# Patient Record
Sex: FEMALE | Race: WHITE | NOT HISPANIC OR LATINO | Employment: FULL TIME | URBAN - METROPOLITAN AREA
[De-identification: names, ages, dates, MRNs, and addresses within clinical notes are randomized per-mention and may not be internally consistent; named-entity substitution may affect disease eponyms.]

---

## 2023-03-08 LAB
ALANINE AMINOTRANSFERASE (SGPT) (U/L) IN SER/PLAS: 24 U/L (ref 7–45)
ALBUMIN (G/DL) IN SER/PLAS: 4.9 G/DL (ref 3.4–5)
ALBUMIN (MG/L) IN URINE: 31.2 MG/L
ALBUMIN/CREATININE (UG/MG) IN URINE: 78 UG/MG CRT (ref 0–30)
ALKALINE PHOSPHATASE (U/L) IN SER/PLAS: 82 U/L (ref 33–136)
ANION GAP IN SER/PLAS: 15 MMOL/L (ref 10–20)
APPEARANCE, URINE: CLEAR
ASPARTATE AMINOTRANSFERASE (SGOT) (U/L) IN SER/PLAS: 29 U/L (ref 9–39)
BASOPHILS (10*3/UL) IN BLOOD BY AUTOMATED COUNT: 0.06 X10E9/L (ref 0–0.1)
BASOPHILS/100 LEUKOCYTES IN BLOOD BY AUTOMATED COUNT: 1 % (ref 0–2)
BILIRUBIN TOTAL (MG/DL) IN SER/PLAS: 0.7 MG/DL (ref 0–1.2)
BILIRUBIN, URINE: NEGATIVE
BLOOD, URINE: NEGATIVE
CALCIUM (MG/DL) IN SER/PLAS: 10.7 MG/DL (ref 8.6–10.6)
CARBON DIOXIDE, TOTAL (MMOL/L) IN SER/PLAS: 31 MMOL/L (ref 21–32)
CHLORIDE (MMOL/L) IN SER/PLAS: 103 MMOL/L (ref 98–107)
CHOLESTEROL (MG/DL) IN SER/PLAS: 193 MG/DL (ref 0–199)
CHOLESTEROL IN HDL (MG/DL) IN SER/PLAS: 95 MG/DL
CHOLESTEROL/HDL RATIO: 2
COLOR, URINE: NORMAL
CREATININE (MG/DL) IN SER/PLAS: 0.8 MG/DL (ref 0.5–1.05)
CREATININE (MG/DL) IN URINE: 40 MG/DL (ref 20–320)
EOSINOPHILS (10*3/UL) IN BLOOD BY AUTOMATED COUNT: 0.06 X10E9/L (ref 0–0.7)
EOSINOPHILS/100 LEUKOCYTES IN BLOOD BY AUTOMATED COUNT: 1 % (ref 0–6)
ERYTHROCYTE DISTRIBUTION WIDTH (RATIO) BY AUTOMATED COUNT: 14.8 % (ref 11.5–14.5)
ERYTHROCYTE MEAN CORPUSCULAR HEMOGLOBIN CONCENTRATION (G/DL) BY AUTOMATED: 32 G/DL (ref 32–36)
ERYTHROCYTE MEAN CORPUSCULAR VOLUME (FL) BY AUTOMATED COUNT: 95 FL (ref 80–100)
ERYTHROCYTES (10*6/UL) IN BLOOD BY AUTOMATED COUNT: 4.84 X10E12/L (ref 4–5.2)
GFR FEMALE: 80 ML/MIN/1.73M2
GLUCOSE (MG/DL) IN SER/PLAS: 108 MG/DL (ref 74–99)
GLUCOSE, URINE: NEGATIVE MG/DL
HEMATOCRIT (%) IN BLOOD BY AUTOMATED COUNT: 46.2 % (ref 36–46)
HEMOGLOBIN (G/DL) IN BLOOD: 14.8 G/DL (ref 12–16)
IMMATURE GRANULOCYTES/100 LEUKOCYTES IN BLOOD BY AUTOMATED COUNT: 0.2 % (ref 0–0.9)
KETONES, URINE: NEGATIVE MG/DL
LDL: 82 MG/DL (ref 0–99)
LEUKOCYTE ESTERASE, URINE: NEGATIVE
LEUKOCYTES (10*3/UL) IN BLOOD BY AUTOMATED COUNT: 5.8 X10E9/L (ref 4.4–11.3)
LYMPHOCYTES (10*3/UL) IN BLOOD BY AUTOMATED COUNT: 1.8 X10E9/L (ref 1.2–4.8)
LYMPHOCYTES/100 LEUKOCYTES IN BLOOD BY AUTOMATED COUNT: 31.1 % (ref 13–44)
MONOCYTES (10*3/UL) IN BLOOD BY AUTOMATED COUNT: 0.47 X10E9/L (ref 0.1–1)
MONOCYTES/100 LEUKOCYTES IN BLOOD BY AUTOMATED COUNT: 8.1 % (ref 2–10)
NEUTROPHILS (10*3/UL) IN BLOOD BY AUTOMATED COUNT: 3.39 X10E9/L (ref 1.2–7.7)
NEUTROPHILS/100 LEUKOCYTES IN BLOOD BY AUTOMATED COUNT: 58.6 % (ref 40–80)
NITRITE, URINE: NEGATIVE
NRBC (PER 100 WBCS) BY AUTOMATED COUNT: 0 /100 WBC (ref 0–0)
PH, URINE: 6 (ref 5–8)
PLATELETS (10*3/UL) IN BLOOD AUTOMATED COUNT: 295 X10E9/L (ref 150–450)
POTASSIUM (MMOL/L) IN SER/PLAS: 5.5 MMOL/L (ref 3.5–5.3)
PROTEIN TOTAL: 7.4 G/DL (ref 6.4–8.2)
PROTEIN, URINE: NEGATIVE MG/DL
SODIUM (MMOL/L) IN SER/PLAS: 143 MMOL/L (ref 136–145)
SPECIFIC GRAVITY, URINE: 1.01 (ref 1–1.03)
THYROTROPIN (MIU/L) IN SER/PLAS BY DETECTION LIMIT <= 0.05 MIU/L: 2.45 MIU/L (ref 0.44–3.98)
TRIGLYCERIDE (MG/DL) IN SER/PLAS: 82 MG/DL (ref 0–149)
UREA NITROGEN (MG/DL) IN SER/PLAS: 18 MG/DL (ref 6–23)
UROBILINOGEN, URINE: <2 MG/DL (ref 0–1.9)
VLDL: 16 MG/DL (ref 0–40)

## 2023-09-13 LAB
ALANINE AMINOTRANSFERASE (SGPT) (U/L) IN SER/PLAS: 24 U/L (ref 7–45)
ALBUMIN (G/DL) IN SER/PLAS: 4.8 G/DL (ref 3.4–5)
ALKALINE PHOSPHATASE (U/L) IN SER/PLAS: 83 U/L (ref 33–136)
ANION GAP IN SER/PLAS: 14 MMOL/L (ref 10–20)
ASPARTATE AMINOTRANSFERASE (SGOT) (U/L) IN SER/PLAS: 30 U/L (ref 9–39)
BILIRUBIN TOTAL (MG/DL) IN SER/PLAS: 0.6 MG/DL (ref 0–1.2)
CALCIUM (MG/DL) IN SER/PLAS: 9.9 MG/DL (ref 8.6–10.6)
CARBON DIOXIDE, TOTAL (MMOL/L) IN SER/PLAS: 29 MMOL/L (ref 21–32)
CHLORIDE (MMOL/L) IN SER/PLAS: 103 MMOL/L (ref 98–107)
CHOLESTEROL (MG/DL) IN SER/PLAS: 185 MG/DL (ref 0–199)
CHOLESTEROL IN HDL (MG/DL) IN SER/PLAS: 98.2 MG/DL
CHOLESTEROL/HDL RATIO: 1.9
CREATININE (MG/DL) IN SER/PLAS: 0.89 MG/DL (ref 0.5–1.05)
GFR FEMALE: 70 ML/MIN/1.73M2
GLUCOSE (MG/DL) IN SER/PLAS: 97 MG/DL (ref 74–99)
LDL: 69 MG/DL (ref 0–99)
POTASSIUM (MMOL/L) IN SER/PLAS: 4.3 MMOL/L (ref 3.5–5.3)
PROTEIN TOTAL: 7.2 G/DL (ref 6.4–8.2)
SODIUM (MMOL/L) IN SER/PLAS: 142 MMOL/L (ref 136–145)
THYROTROPIN (MIU/L) IN SER/PLAS BY DETECTION LIMIT <= 0.05 MIU/L: 14.36 MIU/L (ref 0.44–3.98)
THYROXINE (T4) FREE (NG/DL) IN SER/PLAS: 0.9 NG/DL (ref 0.78–1.48)
TRIGLYCERIDE (MG/DL) IN SER/PLAS: 90 MG/DL (ref 0–149)
UREA NITROGEN (MG/DL) IN SER/PLAS: 14 MG/DL (ref 6–23)
VLDL: 18 MG/DL (ref 0–40)

## 2023-09-18 PROBLEM — N95.1 POSTMENOPAUSAL DISORDER: Status: ACTIVE | Noted: 2023-09-18

## 2023-09-18 PROBLEM — M25.651 DECREASED RANGE OF RIGHT HIP MOVEMENT: Status: ACTIVE | Noted: 2023-09-18

## 2023-09-18 PROBLEM — R35.0 URINARY FREQUENCY: Status: ACTIVE | Noted: 2023-09-18

## 2023-09-18 PROBLEM — H10.10 ALLERGIC CONJUNCTIVITIS: Status: ACTIVE | Noted: 2023-09-18

## 2023-09-18 PROBLEM — N95.2 ATROPHY OF VAGINA: Status: ACTIVE | Noted: 2023-09-18

## 2023-09-18 PROBLEM — J30.9 ALLERGIC RHINITIS: Status: ACTIVE | Noted: 2023-09-18

## 2023-09-18 PROBLEM — R39.9 ABNORMAL RENAL FINDING: Status: ACTIVE | Noted: 2023-09-18

## 2023-09-18 PROBLEM — R94.39 ABNORMAL STRESS TEST: Status: ACTIVE | Noted: 2023-09-18

## 2023-09-18 PROBLEM — M41.80 DEXTROSCOLIOSIS: Status: ACTIVE | Noted: 2023-09-18

## 2023-09-18 PROBLEM — R23.9 RECENT SKIN CHANGES: Status: ACTIVE | Noted: 2023-09-18

## 2023-09-18 PROBLEM — R79.89 ABNORMAL LIVER FUNCTION TEST: Status: ACTIVE | Noted: 2023-09-18

## 2023-09-18 PROBLEM — R32 URINARY INCONTINENCE: Status: ACTIVE | Noted: 2023-09-18

## 2023-09-18 PROBLEM — T50.Z95A VACCINE REACTION: Status: ACTIVE | Noted: 2023-09-18

## 2023-09-18 PROBLEM — R09.89 CAROTID BRUIT: Status: ACTIVE | Noted: 2023-09-18

## 2023-09-18 PROBLEM — K30 ACID INDIGESTION: Status: ACTIVE | Noted: 2023-09-18

## 2023-09-18 PROBLEM — H90.3 SENSORINEURAL HEARING LOSS (SNHL) OF BOTH EARS: Status: ACTIVE | Noted: 2023-09-18

## 2023-09-18 PROBLEM — E78.5 HYPERLIPIDEMIA: Status: ACTIVE | Noted: 2023-09-18

## 2023-09-18 PROBLEM — E03.9 HYPOTHYROIDISM: Status: ACTIVE | Noted: 2023-09-18

## 2023-09-18 PROBLEM — R71.8 ELEVATED MCV: Status: ACTIVE | Noted: 2023-09-18

## 2023-09-18 PROBLEM — R74.8 ELEVATED LIVER ENZYMES: Status: ACTIVE | Noted: 2023-09-18

## 2023-09-18 PROBLEM — M94.0 COSTOCHONDRITIS: Status: ACTIVE | Noted: 2023-09-18

## 2023-09-18 PROBLEM — R39.9 SYMPTOMS INVOLVING URINARY SYSTEM: Status: ACTIVE | Noted: 2023-09-18

## 2023-09-18 PROBLEM — I25.10 NONOBSTRUCTIVE ATHEROSCLEROSIS OF CORONARY ARTERY: Status: ACTIVE | Noted: 2023-09-18

## 2023-09-18 PROBLEM — E55.9 VITAMIN D DEFICIENCY: Status: ACTIVE | Noted: 2023-09-18

## 2023-09-18 PROBLEM — H91.90 DECREASED HEARING: Status: ACTIVE | Noted: 2023-09-18

## 2023-09-18 PROBLEM — R00.1 BRADYCARDIA: Status: ACTIVE | Noted: 2023-09-18

## 2023-09-18 PROBLEM — R14.2 BURPING: Status: ACTIVE | Noted: 2023-09-18

## 2023-09-18 PROBLEM — Z96.641 STATUS POST RIGHT HIP REPLACEMENT: Status: ACTIVE | Noted: 2023-09-18

## 2023-09-18 PROBLEM — R20.2 TINGLING IN EXTREMITIES: Status: ACTIVE | Noted: 2023-09-18

## 2023-09-18 PROBLEM — I10 HYPERTENSION: Status: ACTIVE | Noted: 2023-09-18

## 2023-09-18 PROBLEM — R93.1 ELEVATED CORONARY ARTERY CALCIUM SCORE: Status: ACTIVE | Noted: 2023-09-18

## 2023-09-18 PROBLEM — M95.4: Status: ACTIVE | Noted: 2023-09-18

## 2023-09-18 PROBLEM — H57.89 EYE IRRITATION: Status: ACTIVE | Noted: 2023-09-18

## 2023-09-18 PROBLEM — R92.30 DENSE BREAST TISSUE: Status: ACTIVE | Noted: 2023-09-18

## 2023-09-18 PROBLEM — R00.2 HEART PALPITATIONS: Status: ACTIVE | Noted: 2023-09-18

## 2023-09-18 PROBLEM — M70.41 PREPATELLAR BURSITIS OF RIGHT KNEE: Status: ACTIVE | Noted: 2023-09-18

## 2023-09-18 RX ORDER — LIDOCAINE 50 MG/G
1 PATCH TOPICAL DAILY
COMMUNITY
Start: 2022-05-26 | End: 2023-10-18 | Stop reason: ALTCHOICE

## 2023-09-18 RX ORDER — FLUTICASONE PROPIONATE 50 MCG
2 SPRAY, SUSPENSION (ML) NASAL DAILY
COMMUNITY
Start: 2017-10-23

## 2023-09-18 RX ORDER — LISINOPRIL 10 MG/1
1 TABLET ORAL DAILY
COMMUNITY
Start: 2016-03-22 | End: 2024-03-18 | Stop reason: SDUPTHER

## 2023-09-18 RX ORDER — ETODOLAC 400 MG/1
400 TABLET, FILM COATED ORAL DAILY
COMMUNITY
Start: 2022-05-26 | End: 2023-10-18 | Stop reason: ALTCHOICE

## 2023-09-18 RX ORDER — AMOXICILLIN 500 MG/1
4 CAPSULE ORAL
COMMUNITY
Start: 2022-11-16 | End: 2023-10-18 | Stop reason: ALTCHOICE

## 2023-09-18 RX ORDER — CETIRIZINE HYDROCHLORIDE 10 MG/1
1 TABLET ORAL DAILY
COMMUNITY

## 2023-09-18 RX ORDER — ONDANSETRON 4 MG/1
4 TABLET, FILM COATED ORAL EVERY 6 HOURS PRN
COMMUNITY
Start: 2022-08-22 | End: 2023-10-18 | Stop reason: ALTCHOICE

## 2023-09-18 RX ORDER — CHLORHEXIDINE GLUCONATE ORAL RINSE 1.2 MG/ML
15 SOLUTION DENTAL
COMMUNITY
Start: 2022-08-05 | End: 2023-10-18 | Stop reason: ALTCHOICE

## 2023-09-18 RX ORDER — OLOPATADINE HYDROCHLORIDE 2 MG/ML
SOLUTION/ DROPS OPHTHALMIC
COMMUNITY
Start: 2020-10-07

## 2023-09-18 RX ORDER — LEVOTHYROXINE SODIUM 50 UG/1
1 TABLET ORAL DAILY
COMMUNITY
Start: 2021-05-12 | End: 2024-03-18 | Stop reason: SDUPTHER

## 2023-09-18 RX ORDER — CYCLOBENZAPRINE HCL 10 MG
1 TABLET ORAL NIGHTLY PRN
COMMUNITY
Start: 2015-10-07 | End: 2023-12-26

## 2023-09-18 RX ORDER — MUPIROCIN 20 MG/G
OINTMENT TOPICAL
COMMUNITY
End: 2024-03-18 | Stop reason: SDUPTHER

## 2023-09-18 RX ORDER — ROSUVASTATIN CALCIUM 40 MG/1
1 TABLET, COATED ORAL NIGHTLY
COMMUNITY
Start: 2018-11-12 | End: 2024-02-23 | Stop reason: SDUPTHER

## 2023-10-18 ENCOUNTER — OFFICE VISIT (OUTPATIENT)
Dept: CARDIOLOGY | Facility: HOSPITAL | Age: 68
End: 2023-10-18
Payer: MEDICARE

## 2023-10-18 VITALS
HEIGHT: 62 IN | WEIGHT: 114 LBS | DIASTOLIC BLOOD PRESSURE: 60 MMHG | HEART RATE: 83 BPM | BODY MASS INDEX: 20.98 KG/M2 | SYSTOLIC BLOOD PRESSURE: 148 MMHG

## 2023-10-18 DIAGNOSIS — I10 HYPERTENSION, UNSPECIFIED TYPE: ICD-10-CM

## 2023-10-18 DIAGNOSIS — I25.10 NONOBSTRUCTIVE ATHEROSCLEROSIS OF CORONARY ARTERY: Primary | ICD-10-CM

## 2023-10-18 DIAGNOSIS — E78.00 PURE HYPERCHOLESTEROLEMIA: ICD-10-CM

## 2023-10-18 PROCEDURE — 3078F DIAST BP <80 MM HG: CPT | Performed by: INTERNAL MEDICINE

## 2023-10-18 PROCEDURE — 93010 ELECTROCARDIOGRAM REPORT: CPT | Performed by: INTERNAL MEDICINE

## 2023-10-18 PROCEDURE — 3077F SYST BP >= 140 MM HG: CPT | Performed by: INTERNAL MEDICINE

## 2023-10-18 PROCEDURE — 1036F TOBACCO NON-USER: CPT | Performed by: INTERNAL MEDICINE

## 2023-10-18 PROCEDURE — 93005 ELECTROCARDIOGRAM TRACING: CPT | Performed by: INTERNAL MEDICINE

## 2023-10-18 PROCEDURE — 99214 OFFICE O/P EST MOD 30 MIN: CPT | Performed by: INTERNAL MEDICINE

## 2023-10-18 PROCEDURE — 1159F MED LIST DOCD IN RCRD: CPT | Performed by: INTERNAL MEDICINE

## 2023-10-18 PROCEDURE — 1160F RVW MEDS BY RX/DR IN RCRD: CPT | Performed by: INTERNAL MEDICINE

## 2023-10-18 RX ORDER — ASPIRIN 81 MG/1
81 TABLET ORAL DAILY
COMMUNITY
End: 2023-10-18 | Stop reason: ALTCHOICE

## 2023-10-18 ASSESSMENT — PATIENT HEALTH QUESTIONNAIRE - PHQ9
2. FEELING DOWN, DEPRESSED OR HOPELESS: NOT AT ALL
SUM OF ALL RESPONSES TO PHQ9 QUESTIONS 1 AND 2: 0
1. LITTLE INTEREST OR PLEASURE IN DOING THINGS: NOT AT ALL

## 2023-10-18 ASSESSMENT — ENCOUNTER SYMPTOMS
DEPRESSION: 0
OCCASIONAL FEELINGS OF UNSTEADINESS: 0
LOSS OF SENSATION IN FEET: 0

## 2023-10-18 NOTE — PROGRESS NOTES
Primary Care Physician: Milo Joseph MD  Date of Visit: 10/18/2023  9:00 AM EDT  Location of visit: [facility]     Chief Complaint:   Chief Complaint   Patient presents with    Coronary Artery Disease    Hypertension        HPI / Summary:   Lashae Wolf is a 68 y.o. female presents for followup.  She has no complaints.  She is physically active and walks up to 2-1/2 miles daily without chest pain or shortness of breath.  She monitors her blood pressure at home weekly and her blood pressure is usually in the 120s over 60s.  The patient denies chest pain, shortness of breath, palpitations, lightheadedness, syncope, orthopnea, paroxysmal nocturnal dyspnea, lower extremity edema, or bleeding problems.          Past Medical History:  Past Medical History:   Diagnosis Date    Abnormal levels of other serum enzymes 03/15/2019    Elevated liver enzymes    Acquired deformity of chest and rib 07/29/2019    Acquired deformity of rib of left side    Acute atopic conjunctivitis, unspecified eye 05/22/2020    Allergic conjunctivitis    Adverse effect of other vaccines and biological substances, initial encounter 09/22/2022    Vaccine reaction    Asymptomatic menopausal state 05/26/2022    Asymptomatic menopausal state    Bradycardia, unspecified 08/25/2016    Bradycardia    Chondrocostal junction syndrome (tietze) 06/07/2017    Costochondritis    Dorsalgia, unspecified 05/26/2022    Back ache    Encounter for general adult medical examination without abnormal findings 05/26/2022    Encounter for Medicare annual wellness exam    Encounter for immunization 01/06/2015    Flu vaccine need    Encounter for immunization 05/22/2020    Need for pneumococcal vaccination    Encounter for immunization 07/16/2018    Need for shingles vaccine    Encounter for immunization 10/07/2015    Need for vaccination    Eructation 06/07/2017    Burping    Essential (primary) hypertension 10/31/2022    Hypertension     Functional dyspepsia 09/22/2022    Acid indigestion    Hypothyroidism, unspecified 10/18/2022    Hypothyroidism    Menopausal and female climacteric states 07/19/2016    Postmenopausal disorder    Other abnormality of red blood cells 09/09/2019    Elevated MCV    Other forms of scoliosis, site unspecified 02/28/2018    Dextroscoliosis    Other specified abnormal findings of blood chemistry 10/31/2022    Abnormal liver function test    Prepatellar bursitis, right knee 05/09/2022    Prepatellar bursitis of right knee    Unspecified conjunctivitis 05/06/2020    Conjunctivitis    Unspecified symptoms and signs involving the genitourinary system 10/31/2022    Abnormal renal finding    Urge incontinence 02/10/2015    Urge incontinence of urine    Urinary tract infection, site not specified 11/25/2014    Bacterial UTI    Vitamin D deficiency, unspecified 10/31/2022    Vitamin D deficiency        Past Surgical History:  History reviewed. No pertinent surgical history.       Social History:   reports that she has never smoked. She has never used smokeless tobacco. She reports current alcohol use of about 7.0 standard drinks of alcohol per week. She reports that she does not use drugs.     Family History:  family history includes Esophageal cancer in her father; Hypertension in her mother and sister; Parkinsonism in her sister; Stroke in her mother; Throat cancer in her father.      Allergies:  Allergies   Allergen Reactions    Sulfa (Sulfonamide Antibiotics) Other     Respiratory distress          Outpatient Medications:  Current Outpatient Medications   Medication Instructions    aspirin 81 mg, oral, Daily    cetirizine (ZyrTEC) 10 mg tablet 1 tablet, oral, Daily    cyclobenzaprine (Flexeril) 10 mg tablet 1 tablet, oral, Nightly PRN    fluticasone (Flonase) 50 mcg/actuation nasal spray 2 sprays, nasal, Daily    levothyroxine (Synthroid, Levoxyl) 50 mcg tablet 1 tablet, oral, Daily    lisinopril 10 mg tablet 1 tablet, oral,  "Daily    multivitamin/iron/folic acid (CENTRUM WOMEN ORAL) 1 tablet, oral, Daily    mupirocin (Bactroban) 2 % ointment Topical, 3 times daily RT    olopatadine (Pataday) 0.2 % ophthalmic solution ophthalmic (eye)    rosuvastatin (Crestor) 40 mg tablet 1 tablet, oral, Nightly       Physical Exam:  Vitals:    10/18/23 0849   BP: 148/60   BP Location: Left arm   Patient Position: Sitting   BP Cuff Size: Adult   Pulse: 83   Weight: 51.7 kg (114 lb)   Height: 1.575 m (5' 2\")     Wt Readings from Last 5 Encounters:   10/18/23 51.7 kg (114 lb)   03/13/23 49.4 kg (109 lb)   10/19/22 49.9 kg (110 lb 0.5 oz)   07/25/22 52.6 kg (116 lb)   05/26/22 49.9 kg (110 lb)     Body mass index is 20.85 kg/m².   General: Well-developed well-nourished in no acute distress  HEENT: Normocephalic atraumatic  Neck: Supple, JVP is normal negative hepatojugular reflux 2+ carotid pulses right carotid bruit  Pulmonary: Normal respiratory effort, clear to auscultation  Cardiovascular: No right ventricular heave, normal S1 and S2, no murmurs rubs or gallops  Abdomen: Soft nontender nondistended  Extremities: Warm without edema 2+ radial pulses bilaterally   Neurologic: Alert and oriented x3  Psychiatric: Normal mood and affect     Last Labs:  CMP:  Recent Labs     08/10/22  0944 10/26/22  0840 11/16/22  0839 03/08/23  0845 09/13/23  0844    143 141 143 142   K 4.3 4.6 3.5 5.5* 4.3    105 103 103 103   CO2 28 28 28 31 29   ANIONGAP 13 15 14 15 14   BUN 17 18 14 18 14   CREATININE 0.86 0.69 0.74 0.80 0.89   GLUCOSE 126* 103* 113* 108* 97     Recent Labs     05/16/22  0833 08/10/22  0944 10/26/22  0840 03/08/23  0845 09/13/23  0844   ALBUMIN 4.8 4.9 4.5 4.9 4.8   ALKPHOS 67 68 83 82 83   ALT 22 22 18 24 24   AST 23 26 26 29 30   BILITOT 0.7 0.6 0.5 0.7 0.6     CBC:  Recent Labs     05/14/20  0845 05/12/21  0840 05/16/22  0833 08/10/22  0944 03/08/23  0845   WBC 6.2 5.7 5.3 7.6 5.8   HGB 14.7 14.8 14.3 14.6 14.8   HCT 45.2 45.9 44.2 44.3 " "46.2*    324 285 309 295   MCV 98 98 99 99 95     COAG:   Recent Labs     08/28/18  1116   INR 1.0     HEME/ENDO:  Recent Labs     05/16/22  0833 10/26/22  0840 03/08/23  0845 09/13/23  0844   TSH 2.64 2.32 2.45 14.36*      CARDIAC: No results for input(s): \"LDH\", \"CKMB\", \"TROPHS\", \"BNP\" in the last 83567 hours.    No lab exists for component: \"CK\", \"CKMBP\"  Recent Labs     10/26/22  0840 03/08/23  0845 09/13/23  0844   CHOL 192 193 185   LDLF 85 82 69   HDL 86.4 95.0 98.2   TRIG 104 82 90       Last Cardiology Tests:  ECG:  Electrocardiogram performed today that I reviewed shows normal sinus rhythm with sinus arrhythmia nonspecific ST abnormality.         Cath:  Cardiac catheterization August 31, 2018  Coronary Lesion Summary:  Vessel   Stenosis   Vessel Segment  LAD    30% stenosis proximal to mid  RCA    30% stenosis    proximal           Cardiac Imaging:  CT cardiac scoring wo IV contrast  Narrative: Interpreted By:  SHAI GONZALEZ MD  MRN: [id number]  Patient Name: NII VILLA     STUDY:  CT CARDIAC SCORING;  8/30/2023 8:14 am     INDICATION:  high cholesterol  E78.5: Hyperlipidemia.     COMPARISON:  None.     ACCESSION NUMBER(S):  [id number]     ORDERING CLINICIAN:  CLIVE FITZGERALD     TECHNIQUE:  Using prospective ECG gating, CT scan of the coronary arteries was  performed without intravenous contrast. Coronary calcium scoring  was  performed according to the method of Agatston.     FINDINGS:  The score and distribution of calcium in the coronary arteries is as  follows:     LM 0,  .91,  LCx 0.25,  RCA 0,     Total 275     The visualized mid/lower ascending thoracic aorta measures 2.9 cm in  diameter. Moderate-severe calcified atherosclerosis of the descending  thoracic aorta. The heart is normal in size. No pericardial effusion  is present.     No gross evidence of mediastinal or hilar lymphadenopathy or masses  is identified. The visualized segments of the lungs are " "normally  expanded. Bilateral mucous plugging noted.     The visualized subdiaphragmatic structures appear intact.Hepatomegaly.     Impression: 1. Coronary artery calcium score of 275*.  2. Moderate-severe calcified aortic atherosclerosis.     *Coronary artery calcium scoring may be helpful in predicting the  risk for future coronary heart disease events.  According to the  American College of Cardiology Foundation Clinical Expert Consensus  Task Force, such testing provides important prognostic information in  patients with more than one coronary heart disease risk factor. The  coronary artery calcium score correlates with the annual risk of a  non-fatal myocardial infarction or coronary heart disease death.     Coronary artery score            Annual Risk     0-99                             0.4%  100-399                        1.3%  >400                            2.4%     These three \"breakpoints\" correspond to lower, intermediate and high  risk states for future coronary events.  Such information should be  used, along with appropriate clinical judgment, to make decisions  regarding the intensity of risk factor management strategies to treat  blood lipids and to modify other non-lipid coronary risk factors.     Reference: Galeton P et al. Circulation.  2007; 115:402-426     MACRO:  None      Carotid ultrasound October 27, 2022  CONCLUSIONS:  Right Carotid: Findings are consistent with less than 50% stenosis of the right proximal ICA. Right external carotid artery appears patent with no evidence of stenosis. No evidence of hemodynamically significant stenosis of the right common carotid artery. The right vertebral artery is patent with antegrade flow. There are elevated velocities in the right subclavian that are suggestive of disease.  Left Carotid: Findings are consistent with less than 50% stenosis of the left proximal ICA. Left external carotid artery appears patent with no evidence of stenosis. No " evidence of hemodynamically significant stenosis of the left common carotid artery. The left vertebral artery is patent with antegrade flow. No evidence of hemodynamically significant stenosis in the left subclavian.    Assessment/Plan   Diagnoses and all orders for this visit:  Nonobstructive atherosclerosis of coronary artery  Hypertension, unspecified type  Pure hypercholesterolemia    In summary Ms. Wolf is a pleasant 68 year-old white female with a past medical history significant for nonobstructive coronary artery disease on angiography with a CT calcium score of 275 in 2023 with preserved LV function, hyperlipidemia with previous intolerance to ezetimibe, and hypertension.  She is asymptomatic from a cardiac perspective.  Her blood pressure is mildly elevated today but her home readings are better.  She is going to continue to monitor her blood pressure at home.  Her most recent lipid profile is now goal.  I discussed the risks and benefits of aspirin in the setting of primary invention and we decided to discontinue it.  She should continue her other cardiovascular medications.  We will see her back in follow-up in 1 year.    Orders:  No orders of the defined types were placed in this encounter.     Followup Appts:  Future Appointments   Date Time Provider Department Center   1/24/2024  9:00 AM Vicki Boogie MD [id number] [facility]   1/29/2024  9:00 AM [facility] [id number] DEXA [id number] [facility]   2/26/2024  8:30 AM [facility] AHU EDNA 5 [id number] AHU Rad   3/18/2024  8:30 AM Milo Joseph MD [id number] [facility]   8/5/2024 10:15 AM Sharri Garcia MD [id number] [facility]           ____________________________________________________________  Shemar De La Vega MD  [facility]  [facility]

## 2023-12-03 LAB
ATRIAL RATE: 83 BPM
P AXIS: 87 DEGREES
P OFFSET: 196 MS
P ONSET: 146 MS
PR INTERVAL: 140 MS
Q ONSET: 216 MS
QRS COUNT: 14 BEATS
QRS DURATION: 90 MS
QT INTERVAL: 382 MS
QTC CALCULATION(BAZETT): 448 MS
QTC FREDERICIA: 425 MS
R AXIS: 79 DEGREES
T AXIS: 72 DEGREES
T OFFSET: 407 MS
VENTRICULAR RATE: 83 BPM

## 2023-12-04 ENCOUNTER — LAB (OUTPATIENT)
Dept: LAB | Facility: LAB | Age: 68
End: 2023-12-04
Payer: MEDICARE

## 2023-12-04 DIAGNOSIS — E03.9 HYPOTHYROIDISM, UNSPECIFIED: Primary | ICD-10-CM

## 2023-12-04 LAB — TSH SERPL-ACNC: 1.66 MIU/L (ref 0.44–3.98)

## 2023-12-04 PROCEDURE — 36415 COLL VENOUS BLD VENIPUNCTURE: CPT

## 2023-12-04 PROCEDURE — 84443 ASSAY THYROID STIM HORMONE: CPT

## 2023-12-21 DIAGNOSIS — M54.9 BACK PAIN, UNSPECIFIED BACK LOCATION, UNSPECIFIED BACK PAIN LATERALITY, UNSPECIFIED CHRONICITY: Primary | ICD-10-CM

## 2023-12-26 RX ORDER — CYCLOBENZAPRINE HCL 10 MG
10 TABLET ORAL NIGHTLY PRN
Qty: 90 TABLET | Refills: 3 | Status: SHIPPED | OUTPATIENT
Start: 2023-12-26

## 2024-01-04 ENCOUNTER — APPOINTMENT (OUTPATIENT)
Dept: DERMATOLOGY | Facility: CLINIC | Age: 69
End: 2024-01-04
Payer: MEDICARE

## 2024-01-10 ENCOUNTER — TELEPHONE (OUTPATIENT)
Dept: ORTHOPEDIC SURGERY | Facility: CLINIC | Age: 69
End: 2024-01-10
Payer: MEDICARE

## 2024-01-10 DIAGNOSIS — Z01.818 PRE-OP EXAM: Primary | ICD-10-CM

## 2024-01-10 RX ORDER — AMOXICILLIN 500 MG/1
CAPSULE ORAL
Qty: 4 CAPSULE | Refills: 6 | Status: SHIPPED | OUTPATIENT
Start: 2024-01-10 | End: 2024-03-18 | Stop reason: ALTCHOICE

## 2024-01-10 NOTE — TELEPHONE ENCOUNTER
8/22/22 RT ZACK  Patient needs refill on Amoxicillin 500mg oral tab. Upcoming dental appointment.  [facility]. Gregory Mireles. [place], [place]

## 2024-01-22 ENCOUNTER — TELEPHONE (OUTPATIENT)
Dept: PRIMARY CARE | Facility: CLINIC | Age: 69
End: 2024-01-22
Payer: MEDICARE

## 2024-01-22 DIAGNOSIS — Z78.0 ASYMPTOMATIC MENOPAUSAL STATE: Primary | ICD-10-CM

## 2024-01-29 ENCOUNTER — ANCILLARY PROCEDURE (OUTPATIENT)
Dept: RADIOLOGY | Facility: CLINIC | Age: 69
End: 2024-01-29
Payer: MEDICARE

## 2024-01-29 DIAGNOSIS — Z78.0 ASYMPTOMATIC MENOPAUSAL STATE: ICD-10-CM

## 2024-01-29 PROCEDURE — 77080 DXA BONE DENSITY AXIAL: CPT | Performed by: RADIOLOGY

## 2024-01-29 PROCEDURE — 77080 DXA BONE DENSITY AXIAL: CPT

## 2024-02-01 ASSESSMENT — ENCOUNTER SYMPTOMS
HOARSE VOICE: 0
HEADACHES: 0
SWOLLEN GLANDS: 0
COUGH: 0
SHORTNESS OF BREATH: 0
DIARRHEA: 0
TROUBLE SWALLOWING: 0
SORE THROAT: 1
ABDOMINAL PAIN: 0
VOMITING: 0
NECK PAIN: 1
STRIDOR: 0

## 2024-02-02 ENCOUNTER — TELEMEDICINE (OUTPATIENT)
Dept: PRIMARY CARE | Facility: CLINIC | Age: 69
End: 2024-02-02
Payer: MEDICARE

## 2024-02-02 DIAGNOSIS — J02.9 SORE THROAT: Primary | ICD-10-CM

## 2024-02-02 PROCEDURE — 99213 OFFICE O/P EST LOW 20 MIN: CPT | Performed by: INTERNAL MEDICINE

## 2024-02-02 RX ORDER — DOXYCYCLINE 100 MG/1
100 CAPSULE ORAL 2 TIMES DAILY
Qty: 14 CAPSULE | Refills: 0 | Status: SHIPPED | OUTPATIENT
Start: 2024-02-02 | End: 2024-02-09

## 2024-02-02 ASSESSMENT — ENCOUNTER SYMPTOMS
DIARRHEA: 0
STRIDOR: 0
SWOLLEN GLANDS: 0
ABDOMINAL PAIN: 0
SORE THROAT: 1
HEADACHES: 0
SHORTNESS OF BREATH: 0
COUGH: 0
TROUBLE SWALLOWING: 0
HOARSE VOICE: 0
NECK PAIN: 1
VOMITING: 0

## 2024-02-02 NOTE — PROGRESS NOTES
Subjective   Patient ID: Lashae Wolf is a 68 y.o. female who presents for No chief complaint on file..    Sore Throat   The current episode started in the past 7 days. The problem has been gradually improving. Neither side of throat is experiencing more pain than the other. The maximum temperature recorded prior to her arrival was 102 - 102.9 F. The fever has been present for 1 to 2 days. The pain is at a severity of 6/10. Associated symptoms include congestion, a plugged ear sensation and neck pain. Pertinent negatives include no abdominal pain, coughing, diarrhea, drooling, ear discharge, ear pain, headaches, hoarse voice, shortness of breath, stridor, swollen glands, trouble swallowing or vomiting. She has had no exposure to strep or mono.       I performed this visit using real-time telehealth tools, including an audio/video connection between Lashae Wolf and myself Dr Talavera in office [facility], [place], [place]  Patient on with me on a virtual visit, not feeling well since Monday sore throat Tuesday fever 102 ,covid negative yesterday  No ill contacts,  no travel, a lot of nasal    Review of Systems   HENT:  Positive for congestion and sore throat. Negative for drooling, ear discharge, ear pain, hoarse voice and trouble swallowing.    Respiratory:  Negative for cough, shortness of breath and stridor.    Gastrointestinal:  Negative for abdominal pain, diarrhea and vomiting.   Musculoskeletal:  Positive for neck pain.   Neurological:  Negative for headaches.     General: see hpi  Ears, Nose, Throat : see hpi  Dermatologic: Negative for new skin conditions, rash  Respiratory: see hpi  Cardiovascular: Negative for chest pain, palpitations, or leg swelling  Gastrointestinal: Negative for nausea/vomiting, abdominal pain, changes in bowel habits  Neurological: Negative for dizziness see hpi headaches        Previous history  Past Medical History:   Diagnosis Date    Abnormal levels of  other serum enzymes 03/15/2019    Elevated liver enzymes    Acquired deformity of chest and rib 07/29/2019    Acquired deformity of rib of left side    Acute atopic conjunctivitis, unspecified eye 05/22/2020    Allergic conjunctivitis    Adverse effect of other vaccines and biological substances, initial encounter 09/22/2022    Vaccine reaction    Asymptomatic menopausal state 05/26/2022    Asymptomatic menopausal state    Bradycardia, unspecified 08/25/2016    Bradycardia    Chondrocostal junction syndrome (tietze) 06/07/2017    Costochondritis    Dorsalgia, unspecified 05/26/2022    Back ache    Encounter for general adult medical examination without abnormal findings 05/26/2022    Encounter for Medicare annual wellness exam    Encounter for immunization 01/06/2015    Flu vaccine need    Encounter for immunization 05/22/2020    Need for pneumococcal vaccination    Encounter for immunization 07/16/2018    Need for shingles vaccine    Encounter for immunization 10/07/2015    Need for vaccination    Eructation 06/07/2017    Burping    Essential (primary) hypertension 10/31/2022    Hypertension    Functional dyspepsia 09/22/2022    Acid indigestion    Hypothyroidism, unspecified 10/18/2022    Hypothyroidism    Menopausal and female climacteric states 07/19/2016    Postmenopausal disorder    Other abnormality of red blood cells 09/09/2019    Elevated MCV    Other forms of scoliosis, site unspecified 02/28/2018    Dextroscoliosis    Other specified abnormal findings of blood chemistry 10/31/2022    Abnormal liver function test    Prepatellar bursitis, right knee 05/09/2022    Prepatellar bursitis of right knee    Unspecified conjunctivitis 05/06/2020    Conjunctivitis    Unspecified symptoms and signs involving the genitourinary system 10/31/2022    Abnormal renal finding    Urge incontinence 02/10/2015    Urge incontinence of urine    Urinary tract infection, site not specified 11/25/2014    Bacterial UTI    Vitamin D  deficiency, unspecified 10/31/2022    Vitamin D deficiency     No past surgical history on file.  Social History     Tobacco Use    Smoking status: Never    Smokeless tobacco: Never   Substance Use Topics    Alcohol use: Yes     Alcohol/week: 7.0 standard drinks of alcohol     Types: 7 Glasses of wine per week    Drug use: Never     Family History   Problem Relation Name Age of Onset    Hypertension Mother      Stroke Mother      Esophageal cancer Father      Throat cancer Father      Hypertension Sister      Parkinsonism Sister       Allergies   Allergen Reactions    Sulfa (Sulfonamide Antibiotics) Other     Respiratory distress        Current Outpatient Medications   Medication Instructions    amoxicillin (Amoxil) 500 mg capsule Take 4 Tablets 1 Hour Prior to Dental Procedure or Cleaning.    cetirizine (ZyrTEC) 10 mg tablet 1 tablet, oral, Daily    cyclobenzaprine (FLEXERIL) 10 mg, oral, Nightly PRN    fluticasone (Flonase) 50 mcg/actuation nasal spray 2 sprays, nasal, Daily    levothyroxine (Synthroid, Levoxyl) 50 mcg tablet 1 tablet, oral, Daily    lisinopril 10 mg tablet 1 tablet, oral, Daily    multivitamin/iron/folic acid (CENTRUM WOMEN ORAL) 1 tablet, oral, Daily    mupirocin (Bactroban) 2 % ointment Topical, 3 times daily RT    olopatadine (Pataday) 0.2 % ophthalmic solution ophthalmic (eye)    rosuvastatin (Crestor) 40 mg tablet 1 tablet, oral, Nightly       Objective       Physical Exam  via [employer]  General: Well groomed, well nourished , speaks full sentences  Alert Cooperative , no apparent distress   Skin: Good color does not appear dehydrated   Eyes: Extra ocular muscle movements intact, anicteric sclerae  Neck: Supple, with good range of motion looking behind her and moving head sideways to cough   Neurological: Alert, oriented      Assessment/Plan   Lashae Wolf is a 68 y.o. female who presents for the concerns below:    Problem List Items Addressed This Visit    None    SORE THROAT  FEVER PLAN: Supportive care, plenty of fluids, Tylenol as needed with food, OTC decongestants , SALT WATER gargles five times adayif with elevated blood pressure use prescribed steroid nasal spray, antibiotics if appropriate to treat bacterial etiology,  Doxycycline or erythromycin group), frequent handwashing, sanitize surrounding objects, change toothbrush. If any problems arise patient to call or come back in. More than 50% of office visit time spent counseling the patients, questions were answered.    Time Spent  with patient:  6  minutes of which greater than 50 percent was spent counselling and coordinating care.         Discussed with:   Return in :    Portions of this note were generated using digital voice recognition software, and may contain grammatical errors       Milo Joseph MD  02/02/24  1:04 PM

## 2024-02-08 ENCOUNTER — HOSPITAL ENCOUNTER (OUTPATIENT)
Dept: RADIOLOGY | Facility: CLINIC | Age: 69
Discharge: HOME | End: 2024-02-08
Payer: MEDICARE

## 2024-02-08 ENCOUNTER — OFFICE VISIT (OUTPATIENT)
Dept: PRIMARY CARE | Facility: CLINIC | Age: 69
End: 2024-02-08
Payer: MEDICARE

## 2024-02-08 VITALS — DIASTOLIC BLOOD PRESSURE: 60 MMHG | SYSTOLIC BLOOD PRESSURE: 145 MMHG | BODY MASS INDEX: 20.12 KG/M2 | WEIGHT: 110 LBS

## 2024-02-08 DIAGNOSIS — R05.9 COUGH, UNSPECIFIED TYPE: ICD-10-CM

## 2024-02-08 DIAGNOSIS — E46 PROTEIN-CALORIE MALNUTRITION, UNSPECIFIED SEVERITY (MULTI): Primary | ICD-10-CM

## 2024-02-08 PROCEDURE — 71101 X-RAY EXAM UNILAT RIBS/CHEST: CPT | Mod: RT

## 2024-02-08 PROCEDURE — 99214 OFFICE O/P EST MOD 30 MIN: CPT | Performed by: INTERNAL MEDICINE

## 2024-02-08 PROCEDURE — 3078F DIAST BP <80 MM HG: CPT | Performed by: INTERNAL MEDICINE

## 2024-02-08 PROCEDURE — 71101 X-RAY EXAM UNILAT RIBS/CHEST: CPT | Mod: RIGHT SIDE | Performed by: RADIOLOGY

## 2024-02-08 PROCEDURE — 3077F SYST BP >= 140 MM HG: CPT | Performed by: INTERNAL MEDICINE

## 2024-02-08 PROCEDURE — 1036F TOBACCO NON-USER: CPT | Performed by: INTERNAL MEDICINE

## 2024-02-08 PROCEDURE — 1159F MED LIST DOCD IN RCRD: CPT | Performed by: INTERNAL MEDICINE

## 2024-02-08 ASSESSMENT — PATIENT HEALTH QUESTIONNAIRE - PHQ9
1. LITTLE INTEREST OR PLEASURE IN DOING THINGS: NOT AT ALL
2. FEELING DOWN, DEPRESSED OR HOPELESS: NOT AT ALL
SUM OF ALL RESPONSES TO PHQ9 QUESTIONS 1 AND 2: 0

## 2024-02-08 ASSESSMENT — COLUMBIA-SUICIDE SEVERITY RATING SCALE - C-SSRS
2. HAVE YOU ACTUALLY HAD ANY THOUGHTS OF KILLING YOURSELF?: NO
1. IN THE PAST MONTH, HAVE YOU WISHED YOU WERE DEAD OR WISHED YOU COULD GO TO SLEEP AND NOT WAKE UP?: NO
6. HAVE YOU EVER DONE ANYTHING, STARTED TO DO ANYTHING, OR PREPARED TO DO ANYTHING TO END YOUR LIFE?: NO

## 2024-02-08 ASSESSMENT — ENCOUNTER SYMPTOMS
LOSS OF SENSATION IN FEET: 0
OCCASIONAL FEELINGS OF UNSTEADINESS: 0
DEPRESSION: 0

## 2024-02-08 NOTE — PROGRESS NOTES
Subjective   Patient ID: Lashae Wolf is a 68 y.o. female who presents for EPV and Pain (Lower thorax/upper abdomen.  Right side.  Started the past two night.).    HPI  Patient in for a visit  Stopped the tylenol no fever since Sunday, just the doxycycline   Right hip was painful but now pain is higher on the right side   Sharp pain since a bad coughing spell Monday night and then Tuesday night hurt again right side , took a cyclobenzaprine last night it helped to take down the pain from 8-9/10 to a 4-5/10 , pain with laying down and taking very deep breaths     Review of Systems  General: Denies fever, chills, night sweats,  Eyes: Negative for recent visual changes  Ears, Nose, Throat :  Negative for hearing changes, sinus discomfort  Dermatologic: Negative for new skin conditions, rash  Respiratory: Negative for wheezing, shortness of breath, cough  Cardiovascular: Negative for chest pain, palpitations, or leg swelling  Gastrointestinal: Negative for nausea/vomiting, abdominal pain, changes in bowel habits  Genitourinary NEGATIVE FOR URINARY INCONTINENCE urgency , frequency, discomfort   Musculoskeletal: see hpi  Neurological: Negative for headaches, dizziness    Previous history  Past Medical History:   Diagnosis Date    Abnormal levels of other serum enzymes 03/15/2019    Elevated liver enzymes    Acquired deformity of chest and rib 07/29/2019    Acquired deformity of rib of left side    Acute atopic conjunctivitis, unspecified eye 05/22/2020    Allergic conjunctivitis    Adverse effect of other vaccines and biological substances, initial encounter 09/22/2022    Vaccine reaction    Asymptomatic menopausal state 05/26/2022    Asymptomatic menopausal state    Bradycardia, unspecified 08/25/2016    Bradycardia    Chondrocostal junction syndrome (tietze) 06/07/2017    Costochondritis    Dorsalgia, unspecified 05/26/2022    Back ache    Encounter for general adult medical examination without abnormal findings  05/26/2022    Encounter for Medicare annual wellness exam    Encounter for immunization 01/06/2015    Flu vaccine need    Encounter for immunization 05/22/2020    Need for pneumococcal vaccination    Encounter for immunization 07/16/2018    Need for shingles vaccine    Encounter for immunization 10/07/2015    Need for vaccination    Eructation 06/07/2017    Burping    Essential (primary) hypertension 10/31/2022    Hypertension    Functional dyspepsia 09/22/2022    Acid indigestion    Hypothyroidism, unspecified 10/18/2022    Hypothyroidism    Menopausal and female climacteric states 07/19/2016    Postmenopausal disorder    Other abnormality of red blood cells 09/09/2019    Elevated MCV    Other forms of scoliosis, site unspecified 02/28/2018    Dextroscoliosis    Other specified abnormal findings of blood chemistry 10/31/2022    Abnormal liver function test    Prepatellar bursitis, right knee 05/09/2022    Prepatellar bursitis of right knee    Unspecified conjunctivitis 05/06/2020    Conjunctivitis    Unspecified symptoms and signs involving the genitourinary system 10/31/2022    Abnormal renal finding    Urge incontinence 02/10/2015    Urge incontinence of urine    Urinary tract infection, site not specified 11/25/2014    Bacterial UTI    Vitamin D deficiency, unspecified 10/31/2022    Vitamin D deficiency     No past surgical history on file.  Social History     Tobacco Use    Smoking status: Never    Smokeless tobacco: Never   Vaping Use    Vaping Use: Never used   Substance Use Topics    Alcohol use: Yes     Alcohol/week: 7.0 standard drinks of alcohol     Types: 7 Glasses of wine per week    Drug use: Never     Family History   Problem Relation Name Age of Onset    Hypertension Mother      Stroke Mother      Esophageal cancer Father      Throat cancer Father      Hypertension Sister      Parkinsonism Sister       Allergies   Allergen Reactions    Amoxicillin-Pot Clavulanate GI Upset    Sulfa (Sulfonamide  Antibiotics) Other     Respiratory distress        Current Outpatient Medications   Medication Instructions    amoxicillin (Amoxil) 500 mg capsule Take 4 Tablets 1 Hour Prior to Dental Procedure or Cleaning.    cetirizine (ZyrTEC) 10 mg tablet 1 tablet, oral, Daily    cyclobenzaprine (FLEXERIL) 10 mg, oral, Nightly PRN    doxycycline (VIBRAMYCIN) 100 mg, oral, 2 times daily, Take with at least 8 ounces (large glass) of water, do not lie down for 30 minutes after    fluticasone (Flonase) 50 mcg/actuation nasal spray 2 sprays, nasal, Daily    levothyroxine (Synthroid, Levoxyl) 50 mcg tablet 1 tablet, oral, Daily    lisinopril 10 mg tablet 1 tablet, oral, Daily    multivitamin/iron/folic acid (CENTRUM WOMEN ORAL) 1 tablet, oral, Daily    mupirocin (Bactroban) 2 % ointment Topical, 3 times daily RT    olopatadine (Pataday) 0.2 % ophthalmic solution ophthalmic (eye)    rosuvastatin (Crestor) 40 mg tablet 1 tablet, oral, Nightly       Objective       Physical Exam  Vital Signs: as recorded above  General: Well groomed, well nourished   Orientation:  Alert , oriented to time, place , and person   Mood and Affect:  Cooperative , no apparent distress normal affect  Skin: Good color, good turgor no rash on tender area rib   Eyes: Extra ocular muscle movements intact, anicteric sclerae  Neck: Supple, full range of movement  Chest: Normal breath sounds, tender on the midaxillary line right lower rib margin  chest wall exam, symmetric, good air entry, clear to auscultation  Heart: Regular rate and rhythm, without murmur, gallop, or rubs  Abdomen soft nontender no masses felt no hepatosplenomegaly, no rebound or guarding  BACK:  no CTLS spine tenderness, no flank tenderness  Extremities: full range of movement  bilateral UE and bilateral LE,  no lower extremity edema  Neurological: Alert, oriented, cranial nerves II-XII grossly intact except for visual acuity  Sensation:  Intact  no hyperaesthesia on the right side rib chest    Gait: normal steady      Assessment/Plan   Lashae Wolf is a 68 y.o. female who presents for the concerns below:    Problem List Items Addressed This Visit    None    COUGH in resolution finish doxy afebrile so not infectious but now with   RIGHT SIDED LOW RIB MARGIN PAIN need to rule out fracture since had coughing jags prior may also be strain , lungs clear so likely not pneumonia but the xray should also rule that out , if negative can continue the muscle relaxant and aleve bid with meals for 2-3 days to help with possible strain    HTN ELEVATED BLOOD PRESSURE PLAN not optimal control of blood pressure on medication, need to optimize hydration restorative sleep , but likely since still in recovery from this infection     PROTEIN CALORIE - in good BMI levels = 20 will continue to monitor          Discussed with:   Return in :   keep 3/18 visit roughly 5-6 weeks from now     Portions of this note were generated using digital voice recognition software, and may contain grammatical errors       Milo Joseph MD  02/08/24  1:32 PM

## 2024-02-09 ENCOUNTER — TELEPHONE (OUTPATIENT)
Dept: PRIMARY CARE | Facility: CLINIC | Age: 69
End: 2024-02-09
Payer: MEDICARE

## 2024-02-09 NOTE — TELEPHONE ENCOUNTER
Patient stated she is waiting on the results of x-rays done on yesterday. She wanted to let you know that she coughed up green mucus last night and today. She wants to know if she should be concerned?  What should she do?

## 2024-02-10 ENCOUNTER — HOSPITAL ENCOUNTER (OUTPATIENT)
Dept: RADIOLOGY | Facility: CLINIC | Age: 69
Discharge: HOME | End: 2024-02-10
Payer: MEDICARE

## 2024-02-10 DIAGNOSIS — R93.89 ABNORMAL CHEST X-RAY: ICD-10-CM

## 2024-02-10 DIAGNOSIS — R93.89 ABNORMAL CHEST X-RAY: Primary | ICD-10-CM

## 2024-02-10 DIAGNOSIS — J90 PLEURAL EFFUSION: Primary | ICD-10-CM

## 2024-02-10 DIAGNOSIS — J18.9 PNEUMONIA OF BOTH LUNGS DUE TO INFECTIOUS ORGANISM, UNSPECIFIED PART OF LUNG: ICD-10-CM

## 2024-02-10 PROCEDURE — 71250 CT THORAX DX C-: CPT | Performed by: RADIOLOGY

## 2024-02-10 PROCEDURE — 71250 CT THORAX DX C-: CPT

## 2024-02-10 RX ORDER — LEVOFLOXACIN 500 MG/1
500 TABLET, FILM COATED ORAL DAILY
Qty: 5 TABLET | Refills: 0 | Status: SHIPPED | OUTPATIENT
Start: 2024-02-10 | End: 2024-02-15

## 2024-02-23 ENCOUNTER — TELEPHONE (OUTPATIENT)
Dept: PRIMARY CARE | Facility: CLINIC | Age: 69
End: 2024-02-23
Payer: MEDICARE

## 2024-02-23 DIAGNOSIS — E78.5 HYPERLIPIDEMIA, UNSPECIFIED HYPERLIPIDEMIA TYPE: Primary | ICD-10-CM

## 2024-02-23 RX ORDER — ROSUVASTATIN CALCIUM 40 MG/1
40 TABLET, COATED ORAL NIGHTLY
Qty: 90 TABLET | Refills: 1 | Status: SHIPPED | OUTPATIENT
Start: 2024-02-23

## 2024-02-23 NOTE — TELEPHONE ENCOUNTER
Patient called for a refill on    Rosuvastatin 40mg   #90 one tablet at bedtime    [facility] [phone]

## 2024-02-26 ENCOUNTER — HOSPITAL ENCOUNTER (OUTPATIENT)
Dept: RADIOLOGY | Facility: CLINIC | Age: 69
Discharge: HOME | End: 2024-02-26
Payer: MEDICARE

## 2024-02-26 VITALS — HEIGHT: 62 IN | WEIGHT: 110.01 LBS | BODY MASS INDEX: 20.24 KG/M2

## 2024-02-26 DIAGNOSIS — Z12.31 ENCOUNTER FOR SCREENING MAMMOGRAM FOR MALIGNANT NEOPLASM OF BREAST: ICD-10-CM

## 2024-02-26 PROCEDURE — 77067 SCR MAMMO BI INCL CAD: CPT

## 2024-02-26 PROCEDURE — 77063 BREAST TOMOSYNTHESIS BI: CPT | Mod: BILATERAL PROCEDURE | Performed by: RADIOLOGY

## 2024-02-26 PROCEDURE — 77067 SCR MAMMO BI INCL CAD: CPT | Mod: BILATERAL PROCEDURE | Performed by: RADIOLOGY

## 2024-02-28 ENCOUNTER — TELEPHONE (OUTPATIENT)
Dept: PRIMARY CARE | Facility: CLINIC | Age: 69
End: 2024-02-28
Payer: MEDICARE

## 2024-02-28 DIAGNOSIS — J18.9 PNEUMONIA OF BOTH LUNGS DUE TO INFECTIOUS ORGANISM, UNSPECIFIED PART OF LUNG: Primary | ICD-10-CM

## 2024-02-28 DIAGNOSIS — E78.00 HYPERCHOLESTEROLEMIA: ICD-10-CM

## 2024-02-28 DIAGNOSIS — E03.9 HYPOTHYROIDISM, UNSPECIFIED TYPE: ICD-10-CM

## 2024-02-28 NOTE — TELEPHONE ENCOUNTER
Pt has appt next week.  Would like bloodwork done before hand.  Also wondering if she needs a urine?  Also states she needs a repeat xray for f/up from pneumonia     Please place orders.     Pls call pt when orders are in and let know if need to fast, getting urine, and xray

## 2024-03-13 ENCOUNTER — LAB (OUTPATIENT)
Dept: LAB | Facility: LAB | Age: 69
End: 2024-03-13
Payer: MEDICARE

## 2024-03-13 DIAGNOSIS — M85.80 OSTEOPENIA, UNSPECIFIED LOCATION: ICD-10-CM

## 2024-03-13 DIAGNOSIS — E78.00 HYPERCHOLESTEROLEMIA: ICD-10-CM

## 2024-03-13 DIAGNOSIS — J18.9 PNEUMONIA OF BOTH LUNGS DUE TO INFECTIOUS ORGANISM, UNSPECIFIED PART OF LUNG: ICD-10-CM

## 2024-03-13 DIAGNOSIS — I10 HYPERTENSION, UNSPECIFIED TYPE: ICD-10-CM

## 2024-03-13 DIAGNOSIS — E03.9 HYPOTHYROIDISM, UNSPECIFIED TYPE: ICD-10-CM

## 2024-03-13 LAB
ALBUMIN SERPL BCP-MCNC: 4.7 G/DL (ref 3.4–5)
ALP SERPL-CCNC: 67 U/L (ref 33–136)
ALT SERPL W P-5'-P-CCNC: 25 U/L (ref 7–45)
ANION GAP SERPL CALC-SCNC: 14 MMOL/L (ref 10–20)
APPEARANCE UR: CLEAR
AST SERPL W P-5'-P-CCNC: 27 U/L (ref 9–39)
BASOPHILS # BLD AUTO: 0.05 X10*3/UL (ref 0–0.1)
BASOPHILS NFR BLD AUTO: 0.8 %
BILIRUB SERPL-MCNC: 0.6 MG/DL (ref 0–1.2)
BILIRUB UR STRIP.AUTO-MCNC: NEGATIVE MG/DL
BUN SERPL-MCNC: 15 MG/DL (ref 6–23)
CALCIUM SERPL-MCNC: 10.3 MG/DL (ref 8.6–10.6)
CHLORIDE SERPL-SCNC: 105 MMOL/L (ref 98–107)
CHOLEST SERPL-MCNC: 182 MG/DL (ref 0–199)
CHOLESTEROL/HDL RATIO: 2
CO2 SERPL-SCNC: 29 MMOL/L (ref 21–32)
COLOR UR: ABNORMAL
CREAT SERPL-MCNC: 0.8 MG/DL (ref 0.5–1.05)
EGFRCR SERPLBLD CKD-EPI 2021: 80 ML/MIN/1.73M*2
EOSINOPHIL # BLD AUTO: 0.06 X10*3/UL (ref 0–0.7)
EOSINOPHIL NFR BLD AUTO: 0.9 %
ERYTHROCYTE [DISTWIDTH] IN BLOOD BY AUTOMATED COUNT: 14.9 % (ref 11.5–14.5)
GLUCOSE SERPL-MCNC: 106 MG/DL (ref 74–99)
GLUCOSE UR STRIP.AUTO-MCNC: NORMAL MG/DL
HCT VFR BLD AUTO: 42.1 % (ref 36–46)
HDLC SERPL-MCNC: 91.6 MG/DL
HGB BLD-MCNC: 13.4 G/DL (ref 12–16)
IMM GRANULOCYTES # BLD AUTO: 0.02 X10*3/UL (ref 0–0.7)
IMM GRANULOCYTES NFR BLD AUTO: 0.3 % (ref 0–0.9)
KETONES UR STRIP.AUTO-MCNC: NEGATIVE MG/DL
LDLC SERPL CALC-MCNC: 76 MG/DL
LEUKOCYTE ESTERASE UR QL STRIP.AUTO: NEGATIVE
LYMPHOCYTES # BLD AUTO: 1.8 X10*3/UL (ref 1.2–4.8)
LYMPHOCYTES NFR BLD AUTO: 27.8 %
MCH RBC QN AUTO: 31.2 PG (ref 26–34)
MCHC RBC AUTO-ENTMCNC: 31.8 G/DL (ref 32–36)
MCV RBC AUTO: 98 FL (ref 80–100)
MONOCYTES # BLD AUTO: 0.46 X10*3/UL (ref 0.1–1)
MONOCYTES NFR BLD AUTO: 7.1 %
NEUTROPHILS # BLD AUTO: 4.08 X10*3/UL (ref 1.2–7.7)
NEUTROPHILS NFR BLD AUTO: 63.1 %
NITRITE UR QL STRIP.AUTO: NEGATIVE
NON HDL CHOLESTEROL: 90 MG/DL (ref 0–149)
NRBC BLD-RTO: 0 /100 WBCS (ref 0–0)
PH UR STRIP.AUTO: 5 [PH]
PLATELET # BLD AUTO: 240 X10*3/UL (ref 150–450)
POTASSIUM SERPL-SCNC: 5 MMOL/L (ref 3.5–5.3)
PROT SERPL-MCNC: 6.8 G/DL (ref 6.4–8.2)
PROT UR STRIP.AUTO-MCNC: NEGATIVE MG/DL
RBC # BLD AUTO: 4.3 X10*6/UL (ref 4–5.2)
RBC # UR STRIP.AUTO: NEGATIVE /UL
SODIUM SERPL-SCNC: 143 MMOL/L (ref 136–145)
SP GR UR STRIP.AUTO: 1
TRIGL SERPL-MCNC: 71 MG/DL (ref 0–149)
TSH SERPL-ACNC: 1.66 MIU/L (ref 0.44–3.98)
UROBILINOGEN UR STRIP.AUTO-MCNC: NORMAL MG/DL
VLDL: 14 MG/DL (ref 0–40)
WBC # BLD AUTO: 6.5 X10*3/UL (ref 4.4–11.3)

## 2024-03-13 PROCEDURE — 81003 URINALYSIS AUTO W/O SCOPE: CPT

## 2024-03-13 PROCEDURE — 80053 COMPREHEN METABOLIC PANEL: CPT

## 2024-03-13 PROCEDURE — 82043 UR ALBUMIN QUANTITATIVE: CPT

## 2024-03-13 PROCEDURE — 82306 VITAMIN D 25 HYDROXY: CPT

## 2024-03-13 PROCEDURE — 84443 ASSAY THYROID STIM HORMONE: CPT

## 2024-03-13 PROCEDURE — 80061 LIPID PANEL: CPT

## 2024-03-13 PROCEDURE — 82570 ASSAY OF URINE CREATININE: CPT

## 2024-03-13 PROCEDURE — 85025 COMPLETE CBC W/AUTO DIFF WBC: CPT

## 2024-03-13 PROCEDURE — 36415 COLL VENOUS BLD VENIPUNCTURE: CPT

## 2024-03-18 ENCOUNTER — HOSPITAL ENCOUNTER (OUTPATIENT)
Dept: RADIOLOGY | Facility: CLINIC | Age: 69
Discharge: HOME | End: 2024-03-18
Payer: MEDICARE

## 2024-03-18 ENCOUNTER — OFFICE VISIT (OUTPATIENT)
Dept: PRIMARY CARE | Facility: CLINIC | Age: 69
End: 2024-03-18
Payer: MEDICARE

## 2024-03-18 VITALS
SYSTOLIC BLOOD PRESSURE: 136 MMHG | HEIGHT: 63 IN | WEIGHT: 110 LBS | DIASTOLIC BLOOD PRESSURE: 68 MMHG | BODY MASS INDEX: 19.49 KG/M2

## 2024-03-18 DIAGNOSIS — I10 HYPERTENSION, UNSPECIFIED TYPE: ICD-10-CM

## 2024-03-18 DIAGNOSIS — E03.9 HYPOTHYROIDISM, UNSPECIFIED TYPE: ICD-10-CM

## 2024-03-18 DIAGNOSIS — Z00.00 ROUTINE GENERAL MEDICAL EXAMINATION AT HEALTH CARE FACILITY: Primary | ICD-10-CM

## 2024-03-18 DIAGNOSIS — Z87.01 HISTORY OF PNEUMONIA: ICD-10-CM

## 2024-03-18 DIAGNOSIS — E78.00 HYPERCHOLESTEROLEMIA: ICD-10-CM

## 2024-03-18 DIAGNOSIS — Z00.00 MEDICARE ANNUAL WELLNESS VISIT, SUBSEQUENT: ICD-10-CM

## 2024-03-18 DIAGNOSIS — R23.9 SKIN CHANGE: ICD-10-CM

## 2024-03-18 DIAGNOSIS — M85.80 OSTEOPENIA, UNSPECIFIED LOCATION: ICD-10-CM

## 2024-03-18 LAB
25(OH)D3 SERPL-MCNC: 46 NG/ML (ref 30–100)
CREAT UR-MCNC: 21 MG/DL (ref 20–320)
MICROALBUMIN UR-MCNC: <7 MG/L
MICROALBUMIN/CREAT UR: NORMAL MG/G{CREAT}

## 2024-03-18 PROCEDURE — 1170F FXNL STATUS ASSESSED: CPT | Performed by: INTERNAL MEDICINE

## 2024-03-18 PROCEDURE — G0439 PPPS, SUBSEQ VISIT: HCPCS | Performed by: INTERNAL MEDICINE

## 2024-03-18 PROCEDURE — 1036F TOBACCO NON-USER: CPT | Performed by: INTERNAL MEDICINE

## 2024-03-18 PROCEDURE — 99214 OFFICE O/P EST MOD 30 MIN: CPT | Performed by: INTERNAL MEDICINE

## 2024-03-18 PROCEDURE — 71046 X-RAY EXAM CHEST 2 VIEWS: CPT

## 2024-03-18 PROCEDURE — 1159F MED LIST DOCD IN RCRD: CPT | Performed by: INTERNAL MEDICINE

## 2024-03-18 PROCEDURE — 99397 PER PM REEVAL EST PAT 65+ YR: CPT | Performed by: INTERNAL MEDICINE

## 2024-03-18 PROCEDURE — 1160F RVW MEDS BY RX/DR IN RCRD: CPT | Performed by: INTERNAL MEDICINE

## 2024-03-18 PROCEDURE — 3078F DIAST BP <80 MM HG: CPT | Performed by: INTERNAL MEDICINE

## 2024-03-18 PROCEDURE — 3075F SYST BP GE 130 - 139MM HG: CPT | Performed by: INTERNAL MEDICINE

## 2024-03-18 PROCEDURE — 1123F ACP DISCUSS/DSCN MKR DOCD: CPT | Performed by: INTERNAL MEDICINE

## 2024-03-18 PROCEDURE — 1158F ADVNC CARE PLAN TLK DOCD: CPT | Performed by: INTERNAL MEDICINE

## 2024-03-18 RX ORDER — MUPIROCIN 20 MG/G
OINTMENT TOPICAL
Qty: 30 G | Refills: 0 | Status: SHIPPED | OUTPATIENT
Start: 2024-03-18

## 2024-03-18 RX ORDER — LEVOTHYROXINE SODIUM 50 UG/1
50 TABLET ORAL DAILY
Qty: 90 TABLET | Refills: 2 | Status: SHIPPED | OUTPATIENT
Start: 2024-03-18

## 2024-03-18 RX ORDER — LISINOPRIL 10 MG/1
10 TABLET ORAL DAILY
Qty: 90 TABLET | Refills: 2 | Status: SHIPPED | OUTPATIENT
Start: 2024-03-18

## 2024-03-18 ASSESSMENT — ENCOUNTER SYMPTOMS
OCCASIONAL FEELINGS OF UNSTEADINESS: 0
DEPRESSION: 0
LOSS OF SENSATION IN FEET: 0

## 2024-03-18 ASSESSMENT — ACTIVITIES OF DAILY LIVING (ADL)
GROCERY_SHOPPING: INDEPENDENT
TAKING_MEDICATION: INDEPENDENT
MANAGING_FINANCES: INDEPENDENT
DRESSING: INDEPENDENT
DOING_HOUSEWORK: INDEPENDENT
BATHING: INDEPENDENT

## 2024-03-18 NOTE — PROGRESS NOTES
Subjective   Patient ID: Lashae Wolf is a 68 y.o. female who presents for Annual Exam.    HPI  Patient in for a visit  Back to walking her 2 miles a day, average 10-30 flights a day ,pneumonia sxs are gone , labs done all good glucose   Patient comes in for a physical exam last one done over a year ago , doing well over-all with no particular complaints. Also is in for laboratory review and health maintenance update.  Updating family history as well.  Interval event - past medical history, surgical, social, and family history reviewed and updated.  Interval care -  Patient is    up to date with dental care.  Patient does     receive routine vision care.    Also here for AW Annual Wellness Visit     Review of Systems  General: Denies fever, chills, night sweats, changes in appetite or weight  ENT: Negative for ear pain, hearing loss, headache, difficulty swallowing, up to date with dental checks   Eyes: Negative for recent visual changes, up to date with eye exams  Dermatologic: Negative for new skin conditions, rash  Respiratory: Negative for paroxysmal nocturnal dyspnea, wheezing,shortness of breath, cough  Cardiovascular: Negative for chest pain, palpitations, or leg swelling  Gastrointestinal: Negative for nausea/vomiting, abdominal pain, changes in bowel habits  Genitourinary: Negative for dysuria, urgency, frequency  URINARY INCONTINENCE   Neurological: Negative for headaches, tremors, dizziness, memory loss, confusion, weakness, paresthesias  Psychiatric: Negative for sleep problems, anxiety, depression, conditions are stable  Endocrine: Negative for heat or cold intolerance, polyuria, polydipsia  Other:All systems have been reviewed and are negative except as previously noted.    Previous history  Past Medical History:   Diagnosis Date    Abnormal levels of other serum enzymes 03/15/2019    Elevated liver enzymes    Acquired deformity of chest and rib 07/29/2019    Acquired deformity of rib of left side     Acute atopic conjunctivitis, unspecified eye 05/22/2020    Allergic conjunctivitis    Adverse effect of other vaccines and biological substances, initial encounter 09/22/2022    Vaccine reaction    Asymptomatic menopausal state 05/26/2022    Asymptomatic menopausal state    Bradycardia, unspecified 08/25/2016    Bradycardia    Chondrocostal junction syndrome (tietze) 06/07/2017    Costochondritis    Dorsalgia, unspecified 05/26/2022    Back ache    Encounter for general adult medical examination without abnormal findings 05/26/2022    Encounter for Medicare annual wellness exam    Encounter for immunization 01/06/2015    Flu vaccine need    Encounter for immunization 05/22/2020    Need for pneumococcal vaccination    Encounter for immunization 07/16/2018    Need for shingles vaccine    Encounter for immunization 10/07/2015    Need for vaccination    Eructation 06/07/2017    Burping    Essential (primary) hypertension 10/31/2022    Hypertension    Functional dyspepsia 09/22/2022    Acid indigestion    Hypothyroidism, unspecified 10/18/2022    Hypothyroidism    Menopausal and female climacteric states 07/19/2016    Postmenopausal disorder    Other abnormality of red blood cells 09/09/2019    Elevated MCV    Other forms of scoliosis, site unspecified 02/28/2018    Dextroscoliosis    Other specified abnormal findings of blood chemistry 10/31/2022    Abnormal liver function test    Prepatellar bursitis, right knee 05/09/2022    Prepatellar bursitis of right knee    Unspecified conjunctivitis 05/06/2020    Conjunctivitis    Unspecified symptoms and signs involving the genitourinary system 10/31/2022    Abnormal renal finding    Urge incontinence 02/10/2015    Urge incontinence of urine    Urinary tract infection, site not specified 11/25/2014    Bacterial UTI    Vitamin D deficiency, unspecified 10/31/2022    Vitamin D deficiency     No past surgical history on file.  Social History     Tobacco Use    Smoking status:  Never    Smokeless tobacco: Never   Vaping Use    Vaping Use: Never used   Substance Use Topics    Alcohol use: Yes     Alcohol/week: 7.0 standard drinks of alcohol     Types: 7 Glasses of wine per week    Drug use: Never     Family History   Problem Relation Name Age of Onset    Hypertension Mother      Stroke Mother      Esophageal cancer Father      Throat cancer Father      Hypertension Sister      Parkinsonism Sister       Allergies   Allergen Reactions    Amoxicillin-Pot Clavulanate GI Upset    Sulfa (Sulfonamide Antibiotics) Other     Respiratory distress        Current Outpatient Medications   Medication Instructions    amoxicillin (Amoxil) 500 mg capsule Take 4 Tablets 1 Hour Prior to Dental Procedure or Cleaning.    cetirizine (ZyrTEC) 10 mg tablet 1 tablet, oral, Daily    cyclobenzaprine (FLEXERIL) 10 mg, oral, Nightly PRN    fluticasone (Flonase) 50 mcg/actuation nasal spray 2 sprays, nasal, Daily    levothyroxine (Synthroid, Levoxyl) 50 mcg tablet 1 tablet, oral, Daily    lisinopril 10 mg tablet 1 tablet, oral, Daily    multivitamin/iron/folic acid (CENTRUM WOMEN ORAL) 1 tablet, oral, Daily    mupirocin (Bactroban) 2 % ointment Topical, 3 times daily RT    olopatadine (Pataday) 0.2 % ophthalmic solution ophthalmic (eye)    rosuvastatin (CRESTOR) 40 mg, oral, Nightly       Objective       Physical Exam  Vital Signs: as recorded above  General: Well groomed, well nourished   Orientation:  Alert , oriented to time, place , and person   Mood and Affect:  Cooperative , no apparent distress normal affect  Skin: Good color, good turgor  Eyes: Extra ocular muscle movements intact, anicteric sclerae  Neck: Supple, full range of movement  Chest: Normal breath sounds, normal chest wall exam, symmetric, good air entry, clear to auscultation  Heart: Regular rate and rhythm, without murmur, gallop, or rubs  Abdomen soft nontender no masses felt no hepatosplenomegaly, no rebound or guarding  BACK:  no CTLS spine  tenderness, no flank tenderness  Extremities: full range of movement  bilateral UE and bilateral LE,  no lower extremity edema  Neurological: Alert, oriented, cranial nerves II-XII intact except for visual acuity  Sensation:  Intact   Gait: normal steady      Assessment/Plan   Lashae Wolf is a 68 y.o. female who presents for the concerns below:    Problem List Items Addressed This Visit    None    Hx of pneumonia in feb sxs are better, will recheck xray to make sure all is clear     Hearing test can use hearing aids will do this year    HYPERTENSION PLAN:  , taking blood pressure medication recheck is better midvisit , usual bp at home 138/77 high 148/78 she was working that day  Follow low salt diet, exercise as discussed, weight control. Continue current medications    HYPERCHOLESTEROLEMIA PLAN: stable  seeing DR De La Vega once a year  continue current medications  Follow low cholesterol diet, encouraged high omega 3 fatty acid intake in diet, exercise, weight control. . Will Obtain AST/ALT, fasting lipid profile, creatine kinase  on statin if not done within past 3-6 months . Coenzyme Q10 200 mg a day if able to help increase HDL.    HYPOTHYROIDISM PLAN:  Patient is clinically stable does not have any changes in bowel habits, skin, blood pressure, heart rates, weight, and mood, will be due for TSH check.  Refills as needed.   Reiterated that patient takes medication on an empty stomach.    Going to [place] in June with her dtr and fam, then  in July [place] with her son and fam, wear mask in the airplane     ASSESSMENT AND PLAN: Patient on examination is in good health, will do screening blood tests to screen for high cholesterol, diabetes, thyroid,  Patient should be taking enough calcium in a balanced diet or supplements to total 1200 mg a day in divided doses unless with history of specific types of kidney stones. Vitamin D 800-1000 iu a day, check levels since  last lab work done showed slightly low levels.  .  For Female Patients Only:PAP test yearly as per gynecology   Preventive Medicine: colon cancer screening by age 50 if no family history, balanced diet, and exercise as discussed. Seat belt use for injury prevention  Substance use and /or tobacco use not applicable / counseled when applicable. Immunizations TD  up to date.  Yearly flu vaccine unless contraindicated . More than 50% of office visit time spent counseling the patient, questions were answered. Complete physical examination in a year. Patient knows to call for lab results in two weeks if they do not receive letter or call from our office.    Annual Wellness Visit  the risks and benefits of influenza vaccination were discussed with the patient, influenza vaccine is up to date this year  the risks and benefits of Prevnar 20  vaccination were discussed with the patient, Prevnar 20 vaccine is    up to date  the risks and benefits of pneumonia vaccination were discussed with the patient, pneumonia vaccine is     up to date   the risks and benefits of  Shingrix  vaccination were discussed with the patient, Shingrix  vaccine is   not     up to date there is a shortage of the vaccine  the risks and benefits of tetanus vaccination were discussed with the patient, tetanus vaccine is      up to date   Cardiovascular screening and counseling the risk and benefits of screening were discussed counseling on maintaining a healthy diet and due for lipid panel  Breast cancer screening and counseling , the risks and benefits of screening were discussed with the patient, and screening is current   Cervical cancer screening and counseling , the risks and benefits of screening were discussed with the patient, and screening is current    seeing Dr Garcia this year  Osteoporosis screening and counseling was given on obtaining adequate amounts of calcium and vitamin D on a daily basis and weight bearing exercise such as walking  , the risks and benefits of screening were  discussed with the patient, and screening is current       Colorectal cancer screening and counseling; the risks and benefits of screening were discussed with the patient, colon cancer screening is up to date due next year   Abdominal aortic aneurysm screening and counseling,  the risks and benefits of screening were discussed with the patient, screening is not indicated   Visual acuity / Glaucoma screening and counseling , the risks and benefits of vision screening were discussed with the patient, screening is current   HIV screening and Counseling, the risks and benefits of screening were discussed with the patient, screening is not indicated   Advance directive planning : needs to be updated information forms given to patient .  complete and up  to date   Other Preventive Counseling Provided :  fall risk reduction  seat belt use, sunscreen use , and increasing physical activity .  Patient Discussion:  plan discussed with the patient and follow-up visit needed          Discussed with:   Return in : end of November     Portions of this note were generated using digital voice recognition software, and may contain grammatical errors       Milo Joseph MD  03/18/24  8:49 AM

## 2024-04-29 ENCOUNTER — APPOINTMENT (OUTPATIENT)
Dept: DERMATOLOGY | Facility: HOSPITAL | Age: 69
End: 2024-04-29
Payer: MEDICARE

## 2024-08-05 ENCOUNTER — APPOINTMENT (OUTPATIENT)
Dept: OBSTETRICS AND GYNECOLOGY | Facility: CLINIC | Age: 69
End: 2024-08-05
Payer: MEDICARE

## 2024-08-05 VITALS
WEIGHT: 114 LBS | SYSTOLIC BLOOD PRESSURE: 144 MMHG | BODY MASS INDEX: 20.98 KG/M2 | HEIGHT: 62 IN | DIASTOLIC BLOOD PRESSURE: 72 MMHG

## 2024-08-05 DIAGNOSIS — N95.2 ATROPHY OF VAGINA: ICD-10-CM

## 2024-08-05 DIAGNOSIS — Z78.0 OSTEOPENIA AFTER MENOPAUSE: ICD-10-CM

## 2024-08-05 DIAGNOSIS — Z12.31 ENCOUNTER FOR SCREENING MAMMOGRAM FOR MALIGNANT NEOPLASM OF BREAST: ICD-10-CM

## 2024-08-05 DIAGNOSIS — M85.80 OSTEOPENIA AFTER MENOPAUSE: ICD-10-CM

## 2024-08-05 DIAGNOSIS — Z01.419 ENCOUNTER FOR GYNECOLOGICAL EXAMINATION WITHOUT ABNORMAL FINDING: Primary | ICD-10-CM

## 2024-08-05 PROCEDURE — 1159F MED LIST DOCD IN RCRD: CPT | Performed by: OBSTETRICS & GYNECOLOGY

## 2024-08-05 PROCEDURE — 3077F SYST BP >= 140 MM HG: CPT | Performed by: OBSTETRICS & GYNECOLOGY

## 2024-08-05 PROCEDURE — 1123F ACP DISCUSS/DSCN MKR DOCD: CPT | Performed by: OBSTETRICS & GYNECOLOGY

## 2024-08-05 PROCEDURE — 3078F DIAST BP <80 MM HG: CPT | Performed by: OBSTETRICS & GYNECOLOGY

## 2024-08-05 PROCEDURE — 1036F TOBACCO NON-USER: CPT | Performed by: OBSTETRICS & GYNECOLOGY

## 2024-08-05 PROCEDURE — 3008F BODY MASS INDEX DOCD: CPT | Performed by: OBSTETRICS & GYNECOLOGY

## 2024-08-05 PROCEDURE — 99397 PER PM REEVAL EST PAT 65+ YR: CPT | Performed by: OBSTETRICS & GYNECOLOGY

## 2024-08-05 NOTE — PROGRESS NOTES
Subjective   Lashae Wolf is a 69 y.o. female here for GYN care.  She has no postmenopausal bleeding or pelvic pain.  No dysuria, no change in bowel habits or vaginal discharge.  A bone density in 2024 shows osteopenia, T-score -1.3.    Personal health questionnaire reviewed: yes.     Gynecologic History  No LMP recorded (lmp unknown). Patient is postmenopausal.  Contraception: post menopausal status  Last Pap: 22. Results were: normal  Last mammogram: 24. Results were: normal    Obstetric History  OB History    Para Term  AB Living   2 2 2 0 0 2   SAB IAB Ectopic Multiple Live Births   0 0 0 0 2      # Outcome Date GA Lbr Doug/2nd Weight Sex Type Anes PTL Lv   2 Term            1 Term                Objective   Constitutional: Alert and in no acute distress. Well developed, well nourished.   Head and Face: Head and face: Normal.    Eyes: Normal external exam - nonicteric sclera, extraocular movements intact (EOMI) and no ptosis.   Neck: No neck asymmetry. Supple. Thyroid not enlarged and there were no palpable thyroid nodules.    Pulmonary: No respiratory distress.   Chest: Breasts: Normal appearance, no nipple discharge and no skin changes. Palpation of breasts and axillae: No palpable mass and no axillary lymphadenopathy.   Abdomen: Soft nontender; no abdominal mass palpated. No organomegaly. No hernias.   Genitourinary: External genitalia: Normal. No inguinal lymphadenopathy. Bartholin's Urethral and Skenes Glands: Normal. Urethra: Normal.  Bladder: Normal on palpation. Vagina: Normal. Cervix: Normal.  Uterus: Normal.  Right Adnexa/parametria: Normal.  Left Adnexa/parametria: Normal.  Inspection of Perianal Area: Normal.   Musculoskeletal: No joint swelling seen, normal movements of all extremities.   Skin: Normal skin color and pigmentation, normal skin turgor, and no rash.   Neurologic: Non-focal. Grossly intact.   Psychiatric: Alert and oriented x 3. Affect normal to patient  baseline. Mood: Appropriate.  Physical Exam     Assessment/Plan   This is a 69-year-old female with a normal exam.  No Pap smear was sent, she had a Pap in 2022 and no further Pap smears are needed.    Her routine mammogram is due in February 2025.    She has osteopenia, next bone density is due in January 2026.  I recommend weightbearing exercise, dietary calcium and vitamin D for bone health in menopause.    I will see her in 2 years, or sooner as needed.  Mammogram ordered.

## 2024-08-18 DIAGNOSIS — E78.5 HYPERLIPIDEMIA, UNSPECIFIED HYPERLIPIDEMIA TYPE: ICD-10-CM

## 2024-08-19 RX ORDER — ROSUVASTATIN CALCIUM 40 MG/1
40 TABLET, COATED ORAL NIGHTLY
Qty: 90 TABLET | Refills: 0 | Status: SHIPPED | OUTPATIENT
Start: 2024-08-19

## 2024-08-29 ENCOUNTER — APPOINTMENT (OUTPATIENT)
Dept: DERMATOLOGY | Facility: CLINIC | Age: 69
End: 2024-08-29
Payer: MEDICARE

## 2024-08-30 DIAGNOSIS — E03.9 HYPOTHYROIDISM, UNSPECIFIED TYPE: ICD-10-CM

## 2024-08-30 RX ORDER — LEVOTHYROXINE SODIUM 50 UG/1
50 TABLET ORAL DAILY
Qty: 90 TABLET | Refills: 2 | Status: SHIPPED | OUTPATIENT
Start: 2024-08-30

## 2024-09-25 ENCOUNTER — LAB (OUTPATIENT)
Dept: LAB | Facility: LAB | Age: 69
End: 2024-09-25
Payer: MEDICARE

## 2024-09-25 DIAGNOSIS — I10 HYPERTENSION, UNSPECIFIED TYPE: ICD-10-CM

## 2024-09-25 DIAGNOSIS — M85.80 OSTEOPENIA, UNSPECIFIED LOCATION: ICD-10-CM

## 2024-09-25 DIAGNOSIS — E03.9 HYPOTHYROIDISM, UNSPECIFIED TYPE: ICD-10-CM

## 2024-09-25 DIAGNOSIS — E78.00 HYPERCHOLESTEROLEMIA: ICD-10-CM

## 2024-09-25 LAB
25(OH)D3 SERPL-MCNC: 51 NG/ML (ref 30–100)
ALBUMIN SERPL BCP-MCNC: 4.7 G/DL (ref 3.4–5)
ALP SERPL-CCNC: 75 U/L (ref 33–136)
ALT SERPL W P-5'-P-CCNC: 27 U/L (ref 7–45)
ANION GAP SERPL CALC-SCNC: 15 MMOL/L (ref 10–20)
AST SERPL W P-5'-P-CCNC: 32 U/L (ref 9–39)
BILIRUB SERPL-MCNC: 0.5 MG/DL (ref 0–1.2)
BUN SERPL-MCNC: 20 MG/DL (ref 6–23)
CALCIUM SERPL-MCNC: 10.4 MG/DL (ref 8.6–10.6)
CHLORIDE SERPL-SCNC: 104 MMOL/L (ref 98–107)
CHOLEST SERPL-MCNC: 168 MG/DL (ref 0–199)
CHOLESTEROL/HDL RATIO: 2.1
CO2 SERPL-SCNC: 28 MMOL/L (ref 21–32)
CREAT SERPL-MCNC: 0.82 MG/DL (ref 0.5–1.05)
EGFRCR SERPLBLD CKD-EPI 2021: 77 ML/MIN/1.73M*2
GLUCOSE SERPL-MCNC: 107 MG/DL (ref 74–99)
HDLC SERPL-MCNC: 80.4 MG/DL
LDLC SERPL CALC-MCNC: 72 MG/DL
NON HDL CHOLESTEROL: 88 MG/DL (ref 0–149)
POTASSIUM SERPL-SCNC: 5 MMOL/L (ref 3.5–5.3)
PROT SERPL-MCNC: 6.8 G/DL (ref 6.4–8.2)
SODIUM SERPL-SCNC: 142 MMOL/L (ref 136–145)
TRIGL SERPL-MCNC: 76 MG/DL (ref 0–149)
TSH SERPL-ACNC: 1.38 MIU/L (ref 0.44–3.98)
VLDL: 15 MG/DL (ref 0–40)

## 2024-09-25 PROCEDURE — 80053 COMPREHEN METABOLIC PANEL: CPT

## 2024-09-25 PROCEDURE — 80061 LIPID PANEL: CPT

## 2024-09-25 PROCEDURE — 82306 VITAMIN D 25 HYDROXY: CPT

## 2024-09-25 PROCEDURE — 36415 COLL VENOUS BLD VENIPUNCTURE: CPT

## 2024-09-25 PROCEDURE — 84443 ASSAY THYROID STIM HORMONE: CPT

## 2024-10-16 ENCOUNTER — OFFICE VISIT (OUTPATIENT)
Dept: CARDIOLOGY | Facility: HOSPITAL | Age: 69
End: 2024-10-16
Payer: MEDICARE

## 2024-10-16 VITALS
OXYGEN SATURATION: 100 % | HEIGHT: 62 IN | SYSTOLIC BLOOD PRESSURE: 132 MMHG | BODY MASS INDEX: 21.2 KG/M2 | WEIGHT: 115.2 LBS | DIASTOLIC BLOOD PRESSURE: 68 MMHG | RESPIRATION RATE: 17 BRPM | HEART RATE: 75 BPM

## 2024-10-16 DIAGNOSIS — E78.00 PURE HYPERCHOLESTEROLEMIA: ICD-10-CM

## 2024-10-16 DIAGNOSIS — I25.10 NONOBSTRUCTIVE ATHEROSCLEROSIS OF CORONARY ARTERY: Primary | ICD-10-CM

## 2024-10-16 DIAGNOSIS — I10 HYPERTENSION, UNSPECIFIED TYPE: ICD-10-CM

## 2024-10-16 PROCEDURE — 1123F ACP DISCUSS/DSCN MKR DOCD: CPT | Performed by: INTERNAL MEDICINE

## 2024-10-16 PROCEDURE — 99214 OFFICE O/P EST MOD 30 MIN: CPT | Performed by: INTERNAL MEDICINE

## 2024-10-16 PROCEDURE — 99214 OFFICE O/P EST MOD 30 MIN: CPT | Mod: 25 | Performed by: INTERNAL MEDICINE

## 2024-10-16 PROCEDURE — 3078F DIAST BP <80 MM HG: CPT | Performed by: INTERNAL MEDICINE

## 2024-10-16 PROCEDURE — 93010 ELECTROCARDIOGRAM REPORT: CPT | Performed by: INTERNAL MEDICINE

## 2024-10-16 PROCEDURE — 3008F BODY MASS INDEX DOCD: CPT | Performed by: INTERNAL MEDICINE

## 2024-10-16 PROCEDURE — 1036F TOBACCO NON-USER: CPT | Performed by: INTERNAL MEDICINE

## 2024-10-16 PROCEDURE — 3075F SYST BP GE 130 - 139MM HG: CPT | Performed by: INTERNAL MEDICINE

## 2024-10-16 PROCEDURE — G2211 COMPLEX E/M VISIT ADD ON: HCPCS | Performed by: INTERNAL MEDICINE

## 2024-10-16 PROCEDURE — 93005 ELECTROCARDIOGRAM TRACING: CPT | Performed by: INTERNAL MEDICINE

## 2024-10-16 PROCEDURE — 1160F RVW MEDS BY RX/DR IN RCRD: CPT | Performed by: INTERNAL MEDICINE

## 2024-10-16 PROCEDURE — 1159F MED LIST DOCD IN RCRD: CPT | Performed by: INTERNAL MEDICINE

## 2024-10-16 NOTE — PROGRESS NOTES
Primary Care Physician: Mlio Joseph MD  Date of Visit: 10/16/2024  9:00 AM EDT  Location of visit: [facility]     Chief Complaint:   Chief Complaint   Patient presents with    Follow-up        HPI / Summary:   Lashae Wolf is a 69 y.o. female presents for followup.  She has no cardiac complaints.  She walks up to 2 miles daily with no chest pain or shortness of breath.  The patient denies chest pain, shortness of breath, palpitations, lightheadedness, syncope, orthopnea, paroxysmal nocturnal dyspnea, lower extremity edema, or bleeding problems.          Past Medical History:   Diagnosis Date    Abnormal levels of other serum enzymes 03/15/2019    Elevated liver enzymes    Acquired deformity of chest and rib 07/29/2019    Acquired deformity of rib of left side    Acute atopic conjunctivitis, unspecified eye 05/22/2020    Allergic conjunctivitis    Adverse effect of other vaccines and biological substances, initial encounter 09/22/2022    Vaccine reaction    Asymptomatic menopausal state 05/26/2022    Asymptomatic menopausal state    Bradycardia, unspecified 08/25/2016    Bradycardia    Chondrocostal junction syndrome (tietze) 06/07/2017    Costochondritis    Dorsalgia, unspecified 05/26/2022    Back ache    Encounter for general adult medical examination without abnormal findings 05/26/2022    Encounter for Medicare annual wellness exam    Encounter for immunization 01/06/2015    Flu vaccine need    Encounter for immunization 05/22/2020    Need for pneumococcal vaccination    Encounter for immunization 07/16/2018    Need for shingles vaccine    Encounter for immunization 10/07/2015    Need for vaccination    Eructation 06/07/2017    Burping    Essential (primary) hypertension 10/31/2022    Hypertension    Functional dyspepsia 09/22/2022    Acid indigestion    Hypothyroidism, unspecified 10/18/2022    Hypothyroidism    Menopausal and female climacteric states 07/19/2016    Postmenopausal  disorder    Other abnormality of red blood cells 09/09/2019    Elevated MCV    Other forms of scoliosis, site unspecified 02/28/2018    Dextroscoliosis    Other specified abnormal findings of blood chemistry 10/31/2022    Abnormal liver function test    Prepatellar bursitis, right knee 05/09/2022    Prepatellar bursitis of right knee    Unspecified conjunctivitis 05/06/2020    Conjunctivitis    Unspecified symptoms and signs involving the genitourinary system 10/31/2022    Abnormal renal finding    Urge incontinence 02/10/2015    Urge incontinence of urine    Urinary tract infection, site not specified 11/25/2014    Bacterial UTI    Vitamin D deficiency, unspecified 10/31/2022    Vitamin D deficiency        History reviewed. No pertinent surgical history.       Social History:   reports that she has never smoked. She has never used smokeless tobacco. She reports current alcohol use of about 7.0 standard drinks of alcohol per week. She reports that she does not use drugs.     Family History:  family history includes Esophageal cancer in her father; Hypertension in her mother and sister; Parkinsonism in her sister; Stroke in her mother; Throat cancer in her father.      Allergies:  Allergies   Allergen Reactions    Amoxicillin-Pot Clavulanate GI Upset    Sulfa (Sulfonamide Antibiotics) Other     Respiratory distress          Outpatient Medications:  Current Outpatient Medications   Medication Instructions    cetirizine (ZyrTEC) 10 mg tablet 1 tablet, Daily    cyclobenzaprine (FLEXERIL) 10 mg, oral, Nightly PRN    fluticasone (Flonase) 50 mcg/actuation nasal spray 2 sprays, Daily    levothyroxine (SYNTHROID, LEVOXYL) 50 mcg, oral, Daily    lisinopril 10 mg, oral, Daily    multivitamin/iron/folic acid (CENTRUM WOMEN ORAL) 1 tablet, Daily    mupirocin (Bactroban) 2 % ointment Topical, 3 times daily RT    olopatadine (Pataday) 0.2 % ophthalmic solution Administer into affected eye(s).    rosuvastatin (CRESTOR) 40 mg,  "oral, Nightly       Physical Exam:  Vitals:    10/16/24 0846   BP: 132/68   BP Location: Left arm   Pulse: 75   Resp: 17   SpO2: 100%   Weight: 52.3 kg (115 lb 3.2 oz)   Height: 1.575 m (5' 2\")     Wt Readings from Last 5 Encounters:   10/16/24 52.3 kg (115 lb 3.2 oz)   08/05/24 51.7 kg (114 lb)   03/18/24 49.9 kg (110 lb)   02/26/24 49.9 kg (110 lb 0.2 oz)   02/08/24 49.9 kg (110 lb)     Body mass index is 21.07 kg/m².   General: Well-developed well-nourished in no acute distress  HEENT: Normocephalic atraumatic  Neck: Supple, JVP is normal negative hepatojugular reflux 2+ carotid pulses right carotid bruit   Pulmonary: Normal respiratory effort, clear to auscultation  Cardiovascular: No right ventricular heave, normal S1 and S2, no murmurs rubs or gallops  Abdomen: Soft nontender nondistended  Extremities: Warm without edema 2+ radial pulses bilaterally   Neurologic: Alert and oriented x3  Psychiatric: Normal mood and affect  Last Labs:  CMP:  Recent Labs     09/25/24  0847 03/13/24  0849 09/13/23  0844    143 142   K 5.0 5.0 4.3    105 103   CO2 28 29 29   ANIONGAP 15 14 14   BUN 20 15 14   CREATININE 0.82 0.80 0.89   EGFR 77 80  --    GLUCOSE 107* 106* 97     Recent Labs     09/25/24  0847 03/13/24  0849 09/13/23  0844   ALBUMIN 4.7 4.7 4.8   ALKPHOS 75 67 83   ALT 27 25 24   AST 32 27 30   BILITOT 0.5 0.6 0.6     CBC:  Recent Labs     03/13/24  0849 03/08/23  0845 08/10/22  0944   WBC 6.5 5.8 7.6   HGB 13.4 14.8 14.6   HCT 42.1 46.2* 44.3    295 309   MCV 98 95 99     COAG:   Recent Labs     08/28/18  1116   INR 1.0     HEME/ENDO:  Recent Labs     09/25/24  0847 03/13/24  0849 12/04/23  0845   TSH 1.38 1.66 1.66      CARDIAC: No results for input(s): \"LDH\", \"CKMB\", \"TROPHS\", \"BNP\" in the last 92874 hours.    No lab exists for component: \"CK\", \"CKMBP\"  Recent Labs     09/25/24  0847 03/13/24  0849 09/13/23  0844 03/08/23  0845 10/26/22  0840   CHOL 168 182 185 193 192   LDLF  --   --  69 82 85 "   LDLCALC 72 76  --   --   --    HDL 80.4 91.6 98.2 95.0 86.4   TRIG 76 71 90 82 104       Last Cardiology Tests:  ECG:  An electrocardiogram performed today that I reviewed shows normal sinus rhythm and is normal.    Echo:  Echo Results:  No results found for this or any previous visit from the past 3650 days.       Cath:  Cardiac catheterization August 31, 2018  Coronary Lesion Summary:  Vessel   Stenosis   Vessel Segment  LAD    30% stenosis proximal to mid  RCA    30% stenosis    proximal    Stress Test:  Exercise stress echo August 16, 2018  Summary:   1. The resting ejection fraction was estimated at 65%.   2. No stress images.   3. Exersise duration 10 minutes.   4. ECG changes consistent with ischemia.   5. Positive Stress Test.   6. The adequate level of stress was achieved.         Cardiac Imaging:  CT cardiac scoring August 30, 2023  FINDINGS:   The score and distribution of calcium in the coronary arteries is as   follows:      LM 0,   .91,   LCx 0.25,   RCA 0,      Total 275      The visualized mid/lower ascending thoracic aorta measures 2.9 cm in   diameter. Moderate-severe calcified atherosclerosis of the descending   thoracic aorta. The heart is normal in size. No pericardial effusion   is present.     Carotid ultrasound October 2022  CONCLUSIONS:  Right Carotid: Findings are consistent with less than 50% stenosis of the right proximal ICA. Right external carotid artery appears patent with no evidence of stenosis. No evidence of hemodynamically significant stenosis of the right common carotid artery. The right vertebral artery is patent with antegrade flow. There are elevated velocities in the right subclavian that are suggestive of disease.  Left Carotid: Findings are consistent with less than 50% stenosis of the left proximal ICA. Left external carotid artery appears patent with no evidence of stenosis. No evidence of hemodynamically significant stenosis of the left common carotid artery.  The left vertebral artery is patent with antegrade flow. No evidence of hemodynamically significant stenosis in the left subclavian.      Assessment/Plan   Diagnoses and all orders for this visit:  Nonobstructive atherosclerosis of coronary artery  -     ECG 12 lead (Clinic Performed)  Hypertension, unspecified type  Pure hypercholesterolemia    In summary Ms. Wolf is a pleasant 69 year-old white female with a past medical history significant for nonobstructive coronary artery disease on angiography with a CT calcium score of 275 in 2023 with preserved LV function, hyperlipidemia with previous intolerance to ezetimibe, and hypertension.  She is asymptomatic from a cardiac perspective.  Her blood pressure today is borderline.  Her home values are better.  She will continue to monitor blood pressure at home.  Her most recent lipid profile was not quite at goal.  We did discuss other options including retrying ezetimibe or starting Nexletol, Repatha, or Leqvio.  She is going to think about this.  In the meantime she will modify her diet and continued exercise.  She should continue her current cardiovascular medications.  We will see her back in follow-up in 1 year.      Orders:  No orders of the defined types were placed in this encounter.     Followup Appts:  Future Appointments   Date Time Provider Department Center   11/18/2024  8:30 AM Milo Joseph MD [id number] [facility]   12/3/2024  2:00 PM Vicki Boogie MD [id number] [facility]           ____________________________________________________________  Shemar De La Vega MD  [facility]  [facility]

## 2024-10-16 NOTE — PATIENT INSTRUCTIONS
Consider taking Nexletol, ezetimibe, Repatha, or Leqvio for your cholesterol.  Monitor your blood pressure at home.  Follow-up in 1 year.

## 2024-10-17 LAB
ATRIAL RATE: 75 BPM
P AXIS: 85 DEGREES
P OFFSET: 198 MS
P ONSET: 148 MS
PR INTERVAL: 138 MS
Q ONSET: 217 MS
QRS COUNT: 12 BEATS
QRS DURATION: 88 MS
QT INTERVAL: 412 MS
QTC CALCULATION(BAZETT): 460 MS
QTC FREDERICIA: 443 MS
R AXIS: 75 DEGREES
T AXIS: 75 DEGREES
T OFFSET: 423 MS
VENTRICULAR RATE: 75 BPM

## 2024-11-16 DIAGNOSIS — E78.5 HYPERLIPIDEMIA, UNSPECIFIED HYPERLIPIDEMIA TYPE: ICD-10-CM

## 2024-11-18 ENCOUNTER — APPOINTMENT (OUTPATIENT)
Dept: PRIMARY CARE | Facility: CLINIC | Age: 69
End: 2024-11-18
Payer: MEDICARE

## 2024-11-18 ENCOUNTER — LAB (OUTPATIENT)
Dept: LAB | Facility: LAB | Age: 69
End: 2024-11-18
Payer: MEDICARE

## 2024-11-18 VITALS — DIASTOLIC BLOOD PRESSURE: 82 MMHG | BODY MASS INDEX: 20.49 KG/M2 | WEIGHT: 112 LBS | SYSTOLIC BLOOD PRESSURE: 140 MMHG

## 2024-11-18 DIAGNOSIS — R23.9 SKIN CHANGE: ICD-10-CM

## 2024-11-18 DIAGNOSIS — E78.00 HYPERCHOLESTEROLEMIA: Primary | ICD-10-CM

## 2024-11-18 DIAGNOSIS — I10 HYPERTENSION, UNSPECIFIED TYPE: ICD-10-CM

## 2024-11-18 DIAGNOSIS — M54.9 BACK PAIN, UNSPECIFIED BACK LOCATION, UNSPECIFIED BACK PAIN LATERALITY, UNSPECIFIED CHRONICITY: ICD-10-CM

## 2024-11-18 DIAGNOSIS — K92.1 MELENA: ICD-10-CM

## 2024-11-18 DIAGNOSIS — R93.89 ABNORMAL ULTRASOUND OF NECK: ICD-10-CM

## 2024-11-18 LAB
ANION GAP SERPL CALC-SCNC: 13 MMOL/L (ref 10–20)
BASOPHILS # BLD AUTO: 0.06 X10*3/UL (ref 0–0.1)
BASOPHILS NFR BLD AUTO: 1.1 %
BUN SERPL-MCNC: 21 MG/DL (ref 6–23)
CALCIUM SERPL-MCNC: 9.9 MG/DL (ref 8.6–10.6)
CHLORIDE SERPL-SCNC: 106 MMOL/L (ref 98–107)
CO2 SERPL-SCNC: 27 MMOL/L (ref 21–32)
CREAT SERPL-MCNC: 0.72 MG/DL (ref 0.5–1.05)
EGFRCR SERPLBLD CKD-EPI 2021: 90 ML/MIN/1.73M*2
EOSINOPHIL # BLD AUTO: 0.04 X10*3/UL (ref 0–0.7)
EOSINOPHIL NFR BLD AUTO: 0.7 %
ERYTHROCYTE [DISTWIDTH] IN BLOOD BY AUTOMATED COUNT: 14 % (ref 11.5–14.5)
GLUCOSE SERPL-MCNC: 106 MG/DL (ref 74–99)
HCT VFR BLD AUTO: 41.5 % (ref 36–46)
HGB BLD-MCNC: 13.3 G/DL (ref 12–16)
IMM GRANULOCYTES # BLD AUTO: 0.02 X10*3/UL (ref 0–0.7)
IMM GRANULOCYTES NFR BLD AUTO: 0.4 % (ref 0–0.9)
LYMPHOCYTES # BLD AUTO: 1.43 X10*3/UL (ref 1.2–4.8)
LYMPHOCYTES NFR BLD AUTO: 25.8 %
MCH RBC QN AUTO: 32.1 PG (ref 26–34)
MCHC RBC AUTO-ENTMCNC: 32 G/DL (ref 32–36)
MCV RBC AUTO: 100 FL (ref 80–100)
MONOCYTES # BLD AUTO: 0.5 X10*3/UL (ref 0.1–1)
MONOCYTES NFR BLD AUTO: 9 %
NEUTROPHILS # BLD AUTO: 3.5 X10*3/UL (ref 1.2–7.7)
NEUTROPHILS NFR BLD AUTO: 63 %
NRBC BLD-RTO: 0 /100 WBCS (ref 0–0)
PLATELET # BLD AUTO: 288 X10*3/UL (ref 150–450)
POTASSIUM SERPL-SCNC: 4.4 MMOL/L (ref 3.5–5.3)
RBC # BLD AUTO: 4.14 X10*6/UL (ref 4–5.2)
SODIUM SERPL-SCNC: 142 MMOL/L (ref 136–145)
WBC # BLD AUTO: 5.6 X10*3/UL (ref 4.4–11.3)

## 2024-11-18 PROCEDURE — 3079F DIAST BP 80-89 MM HG: CPT | Performed by: INTERNAL MEDICINE

## 2024-11-18 PROCEDURE — 85025 COMPLETE CBC W/AUTO DIFF WBC: CPT

## 2024-11-18 PROCEDURE — 80048 BASIC METABOLIC PNL TOTAL CA: CPT

## 2024-11-18 PROCEDURE — 3077F SYST BP >= 140 MM HG: CPT | Performed by: INTERNAL MEDICINE

## 2024-11-18 PROCEDURE — 1036F TOBACCO NON-USER: CPT | Performed by: INTERNAL MEDICINE

## 2024-11-18 PROCEDURE — 1123F ACP DISCUSS/DSCN MKR DOCD: CPT | Performed by: INTERNAL MEDICINE

## 2024-11-18 PROCEDURE — 36415 COLL VENOUS BLD VENIPUNCTURE: CPT

## 2024-11-18 PROCEDURE — 99214 OFFICE O/P EST MOD 30 MIN: CPT | Performed by: INTERNAL MEDICINE

## 2024-11-18 PROCEDURE — 1158F ADVNC CARE PLAN TLK DOCD: CPT | Performed by: INTERNAL MEDICINE

## 2024-11-18 PROCEDURE — 1159F MED LIST DOCD IN RCRD: CPT | Performed by: INTERNAL MEDICINE

## 2024-11-18 RX ORDER — CYCLOBENZAPRINE HCL 10 MG
10 TABLET ORAL NIGHTLY PRN
Qty: 30 TABLET | Refills: 0 | Status: SHIPPED | OUTPATIENT
Start: 2024-11-18 | End: 2024-12-18

## 2024-11-18 RX ORDER — LISINOPRIL 20 MG/1
20 TABLET ORAL DAILY
Qty: 90 TABLET | Refills: 0 | Status: SHIPPED | OUTPATIENT
Start: 2024-11-18 | End: 2025-11-18

## 2024-11-18 RX ORDER — MUPIROCIN 20 MG/G
OINTMENT TOPICAL
Qty: 30 G | Refills: 0 | Status: SHIPPED | OUTPATIENT
Start: 2024-11-18

## 2024-11-18 RX ORDER — ROSUVASTATIN CALCIUM 40 MG/1
40 TABLET, COATED ORAL NIGHTLY
Qty: 90 TABLET | Refills: 1 | Status: SHIPPED | OUTPATIENT
Start: 2024-11-18

## 2024-11-18 NOTE — PROGRESS NOTES
Subjective   Patient ID: Lashae Wolf is a 69 y.o. female who presents for Follow-up (7 month fuv).    HPI  Patient in for a visit  Dark stools will be getting egd and colonoscopy ,this started a couple of months ago she is nervous   Last cscope was 10 years no polyps then she has always gone to [facility] so they are going there again scheduled next week  Saw Dr Thibodeaux LDL =70   Review of Systems  General: Denies fever, chills, night sweats,  Eyes: Negative for recent visual changes  Ears, Nose, Throat :  Negative for hearing changes, sinus discomfort  Dermatologic: Negative for new skin conditions, rash  Respiratory: Negative for wheezing, shortness of breath, cough  Cardiovascular: Negative for chest pain, palpitations, or leg swelling  Gastrointestinal: Negative for nausea/vomiting, abdominal pain, changes in bowel habits  Genitourinary Negative for Urinary Incontinence  urgency , frequency, discomfort   Musculoskeletal: see hpi  Neurological: Negative for headaches, dizziness    Previous history  Past Medical History:   Diagnosis Date    Abnormal levels of other serum enzymes 03/15/2019    Elevated liver enzymes    Acquired deformity of chest and rib 07/29/2019    Acquired deformity of rib of left side    Acute atopic conjunctivitis, unspecified eye 05/22/2020    Allergic conjunctivitis    Adverse effect of other vaccines and biological substances, initial encounter 09/22/2022    Vaccine reaction    Asymptomatic menopausal state 05/26/2022    Asymptomatic menopausal state    Bradycardia, unspecified 08/25/2016    Bradycardia    Chondrocostal junction syndrome (tietze) 06/07/2017    Costochondritis    Dorsalgia, unspecified 05/26/2022    Back ache    Encounter for general adult medical examination without abnormal findings 05/26/2022    Encounter for Medicare annual wellness exam    Encounter for immunization 01/06/2015    Flu vaccine need    Encounter for immunization 05/22/2020    Need for pneumococcal vaccination     Encounter for immunization 07/16/2018    Need for shingles vaccine    Encounter for immunization 10/07/2015    Need for vaccination    Eructation 06/07/2017    Burping    Essential (primary) hypertension 10/31/2022    Hypertension    Functional dyspepsia 09/22/2022    Acid indigestion    Hypothyroidism, unspecified 10/18/2022    Hypothyroidism    Menopausal and female climacteric states 07/19/2016    Postmenopausal disorder    Other abnormality of red blood cells 09/09/2019    Elevated MCV    Other forms of scoliosis, site unspecified 02/28/2018    Dextroscoliosis    Other specified abnormal findings of blood chemistry 10/31/2022    Abnormal liver function test    Prepatellar bursitis, right knee 05/09/2022    Prepatellar bursitis of right knee    Unspecified conjunctivitis 05/06/2020    Conjunctivitis    Unspecified symptoms and signs involving the genitourinary system 10/31/2022    Abnormal renal finding    Urge incontinence 02/10/2015    Urge incontinence of urine    Urinary tract infection, site not specified 11/25/2014    Bacterial UTI    Vitamin D deficiency, unspecified 10/31/2022    Vitamin D deficiency     No past surgical history on file.  Social History     Tobacco Use    Smoking status: Never    Smokeless tobacco: Never   Vaping Use    Vaping status: Never Used   Substance Use Topics    Alcohol use: Yes     Alcohol/week: 7.0 standard drinks of alcohol     Types: 7 Glasses of wine per week    Drug use: Never     Family History   Problem Relation Name Age of Onset    Hypertension Mother      Stroke Mother      Esophageal cancer Father      Throat cancer Father      Hypertension Sister      Parkinsonism Sister       Allergies   Allergen Reactions    Amoxicillin-Pot Clavulanate GI Upset    Sulfa (Sulfonamide Antibiotics) Other     Respiratory distress        Current Outpatient Medications   Medication Instructions    cetirizine (ZyrTEC) 10 mg tablet 1 tablet, Daily    cyclobenzaprine (FLEXERIL) 10 mg,  oral, Nightly PRN    fluticasone (Flonase) 50 mcg/actuation nasal spray 2 sprays, Daily    levothyroxine (SYNTHROID, LEVOXYL) 50 mcg, oral, Daily    lisinopril 10 mg, oral, Daily    multivitamin/iron/folic acid (CENTRUM WOMEN ORAL) 1 tablet, Daily    mupirocin (Bactroban) 2 % ointment Topical, 3 times daily RT    olopatadine (Pataday) 0.2 % ophthalmic solution Administer into affected eye(s).    rosuvastatin (CRESTOR) 40 mg, oral, Nightly       Objective       Physical Exam  Vital Signs: as recorded above  General: Well groomed, well nourished   Orientation:  Alert , oriented to time, place , and person   Mood and Affect:  Cooperative , no apparent distress normal affect  Skin: Good color, good turgor  Eyes: Extra ocular muscle movements intact, anicteric sclerae  Neck: Supple, full range of movement  Chest: Normal breath sounds, normal chest wall exam, symmetric, good air entry, clear to auscultation  Heart: Regular rate and rhythm, without murmur, gallop, or rubs  Abdomen soft nontender no masses felt no hepatosplenomegaly, no rebound or guarding  BACK:  no CTLS spine tenderness, no flank tenderness  Extremities: full range of movement  bilateral UE and bilateral LE,  no lower extremity edema  Neurological: Alert, oriented, cranial nerves II-XII grossly intact except for visual acuity  Sensation:  Intact   Gait: normal steady      Assessment/Plan   Lashae Wolf is a 69 y.o. female who presents for the concerns below:    Problem List Items Addressed This Visit    None  Melena has not seen Gi but they are scheduled for egd and cscope on Monday  Will check cbc  Ffup in 6 weeks   HYPERCHOLESTEROLEMIA PLAN: stable  continue current medications  Follow low cholesterol diet, encouraged high omega 3 fatty acid intake in diet, exercise, weight control. . Will Obtain AST/ALT, fasting lipid profile, creatine kinase  on statin if not done within past 3-6 months . Coenzyme Q10 200 mg a day if able to help increase  HDL.    HYPERTENSION PLAN:  , taking blood pressure medication will inc lisinopril 20 mg will recheck bmp prior to our ffup   Follow low salt diet, exercise as discussed, weight control. Continue current medications    Some changes in the right carotid will recheck and compare to 2022 and see if we need to lower her LDL     Stop the iron no fish oil n asa for the scopes          Discussed with:   Return in :  6 weeks recheck bp on higher dose ,  and results of egd and cscope     Portions of this note were generated using digital voice recognition software, and may contain grammatical errors       Milo Joseph MD  11/18/24  8:45 AM

## 2024-11-19 ENCOUNTER — TELEPHONE (OUTPATIENT)
Dept: PRIMARY CARE | Facility: CLINIC | Age: 69
End: 2024-11-19
Payer: MEDICARE

## 2024-11-19 RX ORDER — ROSUVASTATIN CALCIUM 40 MG/1
40 TABLET, COATED ORAL NIGHTLY
Qty: 90 TABLET | Refills: 0 | Status: SHIPPED | OUTPATIENT
Start: 2024-11-19

## 2024-11-19 NOTE — TELEPHONE ENCOUNTER
Pt said last time you increased the Lisinopril you went from 5 to 10mg. She wants to make sure she shouldn't be taking 15mg and that 20mg is the new correct dosage. Please advise.

## 2024-11-19 NOTE — TELEPHONE ENCOUNTER
Pt called again just to confirm she is supposed to start taking 20mg of lisinopril and not 15. Thank you.

## 2024-12-03 ENCOUNTER — APPOINTMENT (OUTPATIENT)
Dept: DERMATOLOGY | Facility: CLINIC | Age: 69
End: 2024-12-03
Payer: MEDICARE

## 2025-01-06 ENCOUNTER — APPOINTMENT (OUTPATIENT)
Dept: PRIMARY CARE | Facility: CLINIC | Age: 70
End: 2025-01-06
Payer: MEDICARE

## 2025-02-03 ENCOUNTER — APPOINTMENT (OUTPATIENT)
Dept: PRIMARY CARE | Facility: CLINIC | Age: 70
End: 2025-02-03
Payer: MEDICARE

## 2025-02-18 DIAGNOSIS — I10 HYPERTENSION, UNSPECIFIED TYPE: ICD-10-CM

## 2025-02-18 RX ORDER — LISINOPRIL 20 MG/1
20 TABLET ORAL DAILY
Qty: 90 TABLET | Refills: 0 | Status: SHIPPED | OUTPATIENT
Start: 2025-02-18

## 2025-02-26 ENCOUNTER — OFFICE VISIT (OUTPATIENT)
Dept: PRIMARY CARE | Facility: CLINIC | Age: 70
End: 2025-02-26
Payer: MEDICARE

## 2025-02-26 VITALS — SYSTOLIC BLOOD PRESSURE: 142 MMHG | BODY MASS INDEX: 20.67 KG/M2 | WEIGHT: 113 LBS | DIASTOLIC BLOOD PRESSURE: 72 MMHG

## 2025-02-26 DIAGNOSIS — J02.9 PHARYNGITIS, UNSPECIFIED ETIOLOGY: Primary | ICD-10-CM

## 2025-02-26 PROCEDURE — 1159F MED LIST DOCD IN RCRD: CPT | Performed by: INTERNAL MEDICINE

## 2025-02-26 PROCEDURE — 1036F TOBACCO NON-USER: CPT | Performed by: INTERNAL MEDICINE

## 2025-02-26 PROCEDURE — 3078F DIAST BP <80 MM HG: CPT | Performed by: INTERNAL MEDICINE

## 2025-02-26 PROCEDURE — 3077F SYST BP >= 140 MM HG: CPT | Performed by: INTERNAL MEDICINE

## 2025-02-26 PROCEDURE — 99213 OFFICE O/P EST LOW 20 MIN: CPT | Performed by: INTERNAL MEDICINE

## 2025-02-26 PROCEDURE — 1123F ACP DISCUSS/DSCN MKR DOCD: CPT | Performed by: INTERNAL MEDICINE

## 2025-02-26 RX ORDER — DOXYCYCLINE 100 MG/1
100 CAPSULE ORAL 2 TIMES DAILY
Qty: 20 CAPSULE | Refills: 0 | Status: SHIPPED | OUTPATIENT
Start: 2025-02-26 | End: 2025-03-08

## 2025-02-26 ASSESSMENT — ENCOUNTER SYMPTOMS
FEVER: 1
COUGH: 1
HEADACHES: 0
DYSURIA: 0
WHEEZING: 0
DIARRHEA: 0
NAUSEA: 0
ABDOMINAL PAIN: 0
VOMITING: 0
SORE THROAT: 1

## 2025-02-26 ASSESSMENT — PATIENT HEALTH QUESTIONNAIRE - PHQ9
SUM OF ALL RESPONSES TO PHQ9 QUESTIONS 1 AND 2: 0
1. LITTLE INTEREST OR PLEASURE IN DOING THINGS: NOT AT ALL
2. FEELING DOWN, DEPRESSED OR HOPELESS: NOT AT ALL

## 2025-02-26 NOTE — PROGRESS NOTES
Subjective   Patient ID: Lashae Wolf is a 69 y.o. female who presents for Sore Throat (Sore throat and cough since Monday /Fever this morning was 102).    Sore Throat   Associated symptoms include congestion, coughing and ear pain. Pertinent negatives include no abdominal pain, diarrhea, headaches or vomiting.   Fever   This is a new problem. The current episode started in the past 7 days. The problem occurs 2 to 4 times per day. The problem has been unchanged. The maximum temperature noted was 102 to 102.9 F. The temperature was taken using an oral thermometer. Associated symptoms include congestion, coughing, ear pain and a sore throat. Pertinent negatives include no abdominal pain, chest pain, diarrhea, headaches, muscle aches, nausea, rash, sleepiness, urinary pain, vomiting or wheezing.   Risk factors: recent sickness    Risk factors: no contaminated food, no contaminated water, no hx of cancer, no immunosuppression, no occupational exposure, no recent travel and no sick contacts      Patient in for a visit  Fever back took tylenol in the morning 7:30 fever came back 4 pm  Took it another   Sore throat , cough not a lot of drainage did the nasal rinse   Review of Systems   Constitutional:  Positive for fever.   HENT:  Positive for congestion, ear pain and sore throat.    Respiratory:  Positive for cough. Negative for wheezing.    Cardiovascular:  Negative for chest pain.   Gastrointestinal:  Negative for abdominal pain, diarrhea, nausea and vomiting.   Genitourinary:  Negative for dysuria.   Skin:  Negative for rash.   Neurological:  Negative for headaches.       Previous history  Past Medical History:   Diagnosis Date    Abnormal levels of other serum enzymes 03/15/2019    Elevated liver enzymes    Acquired deformity of chest and rib 07/29/2019    Acquired deformity of rib of left side    Acute atopic conjunctivitis, unspecified eye 05/22/2020    Allergic conjunctivitis    Adverse effect of other vaccines  and biological substances, initial encounter 09/22/2022    Vaccine reaction    Asymptomatic menopausal state 05/26/2022    Asymptomatic menopausal state    Bradycardia, unspecified 08/25/2016    Bradycardia    Chondrocostal junction syndrome (tietze) 06/07/2017    Costochondritis    Dorsalgia, unspecified 05/26/2022    Back ache    Encounter for general adult medical examination without abnormal findings 05/26/2022    Encounter for Medicare annual wellness exam    Encounter for immunization 01/06/2015    Flu vaccine need    Encounter for immunization 05/22/2020    Need for pneumococcal vaccination    Encounter for immunization 07/16/2018    Need for shingles vaccine    Encounter for immunization 10/07/2015    Need for vaccination    Eructation 06/07/2017    Burping    Essential (primary) hypertension 10/31/2022    Hypertension    Functional dyspepsia 09/22/2022    Acid indigestion    Hypothyroidism, unspecified 10/18/2022    Hypothyroidism    Menopausal and female climacteric states 07/19/2016    Postmenopausal disorder    Other abnormality of red blood cells 09/09/2019    Elevated MCV    Other forms of scoliosis, site unspecified 02/28/2018    Dextroscoliosis    Other specified abnormal findings of blood chemistry 10/31/2022    Abnormal liver function test    Prepatellar bursitis, right knee 05/09/2022    Prepatellar bursitis of right knee    Unspecified conjunctivitis 05/06/2020    Conjunctivitis    Unspecified symptoms and signs involving the genitourinary system 10/31/2022    Abnormal renal finding    Urge incontinence 02/10/2015    Urge incontinence of urine    Urinary tract infection, site not specified 11/25/2014    Bacterial UTI    Vitamin D deficiency, unspecified 10/31/2022    Vitamin D deficiency     No past surgical history on file.  Social History     Tobacco Use    Smoking status: Never    Smokeless tobacco: Never   Vaping Use    Vaping status: Never Used   Substance Use Topics    Alcohol use: Yes      Alcohol/week: 7.0 standard drinks of alcohol     Types: 7 Glasses of wine per week    Drug use: Never     Family History   Problem Relation Name Age of Onset    Hypertension Mother      Stroke Mother      Esophageal cancer Father      Throat cancer Father      Hypertension Sister      Parkinsonism Sister       Allergies   Allergen Reactions    Amoxicillin-Pot Clavulanate GI Upset    Sulfa (Sulfonamide Antibiotics) Other     Respiratory distress        Current Outpatient Medications   Medication Instructions    cetirizine (ZyrTEC) 10 mg tablet 1 tablet, Daily    cyclobenzaprine (FLEXERIL) 10 mg, oral, Nightly PRN    fluticasone (Flonase) 50 mcg/actuation nasal spray 2 sprays, Daily    levothyroxine (SYNTHROID, LEVOXYL) 50 mcg, oral, Daily    lisinopril 20 mg, oral, Daily    multivitamin/iron/folic acid (CENTRUM WOMEN ORAL) 1 tablet, Daily    mupirocin (Bactroban) 2 % ointment Topical, 3 times daily RT    olopatadine (Pataday) 0.2 % ophthalmic solution Administer into affected eye(s).    rosuvastatin (CRESTOR) 40 mg, oral, Nightly    rosuvastatin (CRESTOR) 40 mg, oral, Nightly       Objective       Physical Exam  Vital Signs: as recorded above  General: Well groomed, well nourished   Orientation:  Alert , oriented to time, place , and person   Mood and Affect:  Cooperative , no apparent distress normal affect  Skin: Good color, good turgor  Eyes: Extra ocular muscle movements intact, anicteric sclerae  Oral throat posterior pharynx erythematous   Neck: Supple, full range of movement no cervical lad   Chest: Normal breath sounds, normal chest wall exam, symmetric, good air entry, clear to auscultation  BACK:  no CTLS spine tenderness, no flank tenderness  Extremities: full range of movement  bilateral UE and bilateral LE,  no lower extremity edema  Neurological: Alert, oriented, cranial nerves II-XII grossly intact except for visual acuity  Sensation:  Intact   Gait: normal steady      Assessment/Plan   Lashae Wolf  is a 69 y.o. female who presents for the concerns below:    Problem List Items Addressed This Visit    None    PHARYNGITIS   TYLENOL every 6 hours gargling five times a day   Rest, sleep is important.   Hydration important at least 6-8 glasses of water  Frequent hand washing  Sanitize surroundings   Change toothbrush once recovered.   Antimicrobial therapy Doxycycline  since with allergies Live culture yogurt or probiotics to help regrow good bacteria  if grandkids have same sxs get them checked for strep Call if cannot swallow shortness of breath, blood and sputum, change in character of cough,   If with inability to maintain nutrition and hydration seek emergent care.  Avoid exposure to tobacco smoke and fumes. cautioned that  antibiotic may cause c diff colitis  ,    Discussed with:   Return in :    Portions of this note were generated using digital voice recognition software, and may contain grammatical errors       Milo Joseph MD  02/26/25  3:14 PM

## 2025-02-27 ENCOUNTER — APPOINTMENT (OUTPATIENT)
Dept: RADIOLOGY | Facility: CLINIC | Age: 70
End: 2025-02-27
Payer: MEDICARE

## 2025-03-06 ENCOUNTER — HOSPITAL ENCOUNTER (OUTPATIENT)
Dept: RADIOLOGY | Facility: CLINIC | Age: 70
Discharge: HOME | End: 2025-03-06
Payer: MEDICARE

## 2025-03-06 DIAGNOSIS — Z12.31 ENCOUNTER FOR SCREENING MAMMOGRAM FOR MALIGNANT NEOPLASM OF BREAST: ICD-10-CM

## 2025-03-06 DIAGNOSIS — Z01.419 ENCOUNTER FOR GYNECOLOGICAL EXAMINATION WITHOUT ABNORMAL FINDING: ICD-10-CM

## 2025-03-06 PROCEDURE — 77067 SCR MAMMO BI INCL CAD: CPT

## 2025-03-19 ENCOUNTER — HOSPITAL ENCOUNTER (OUTPATIENT)
Dept: VASCULAR MEDICINE | Facility: CLINIC | Age: 70
Discharge: HOME | End: 2025-03-19
Payer: MEDICARE

## 2025-03-19 DIAGNOSIS — Z13.29 SCREENING FOR ENDOCRINE, METABOLIC AND IMMUNITY DISORDER: ICD-10-CM

## 2025-03-19 DIAGNOSIS — R79.9 ABNORMAL BLOOD CHEMISTRY: ICD-10-CM

## 2025-03-19 DIAGNOSIS — Z13.228 SCREENING FOR ENDOCRINE/METABOLIC/IMMUNITY DISORDERS: ICD-10-CM

## 2025-03-19 DIAGNOSIS — Z13.29 SCREENING FOR ENDOCRINE/METABOLIC/IMMUNITY DISORDERS: ICD-10-CM

## 2025-03-19 DIAGNOSIS — Z13.6 SCREENING FOR CARDIOVASCULAR CONDITION: Primary | ICD-10-CM

## 2025-03-19 DIAGNOSIS — Z13.0 SCREENING FOR ENDOCRINE/METABOLIC/IMMUNITY DISORDERS: ICD-10-CM

## 2025-03-19 DIAGNOSIS — R09.89 OTHER SPECIFIED SYMPTOMS AND SIGNS INVOLVING THE CIRCULATORY AND RESPIRATORY SYSTEMS: ICD-10-CM

## 2025-03-19 DIAGNOSIS — E78.00 HYPERCHOLESTEROLEMIA: ICD-10-CM

## 2025-03-19 DIAGNOSIS — Z13.0 SCREENING FOR ENDOCRINE, METABOLIC AND IMMUNITY DISORDER: ICD-10-CM

## 2025-03-19 DIAGNOSIS — Z13.228 SCREENING FOR ENDOCRINE, METABOLIC AND IMMUNITY DISORDER: ICD-10-CM

## 2025-03-19 PROCEDURE — 93880 EXTRACRANIAL BILAT STUDY: CPT | Performed by: SURGERY

## 2025-03-19 PROCEDURE — 93880 EXTRACRANIAL BILAT STUDY: CPT

## 2025-03-20 LAB
ALBUMIN SERPL-MCNC: 4.7 G/DL (ref 3.6–5.1)
ALP SERPL-CCNC: 70 U/L (ref 37–153)
ALT SERPL-CCNC: 18 U/L (ref 6–29)
ANION GAP SERPL CALCULATED.4IONS-SCNC: 11 MMOL/L (CALC) (ref 7–17)
AST SERPL-CCNC: 23 U/L (ref 10–35)
BILIRUB SERPL-MCNC: 0.6 MG/DL (ref 0.2–1.2)
BUN SERPL-MCNC: 20 MG/DL (ref 7–25)
CALCIUM SERPL-MCNC: 10.3 MG/DL (ref 8.6–10.4)
CHLORIDE SERPL-SCNC: 105 MMOL/L (ref 98–110)
CHOLEST SERPL-MCNC: 195 MG/DL
CHOLEST/HDLC SERPL: 2.4 (CALC)
CO2 SERPL-SCNC: 25 MMOL/L (ref 20–32)
CREAT SERPL-MCNC: 0.78 MG/DL (ref 0.6–1)
EGFRCR SERPLBLD CKD-EPI 2021: 82 ML/MIN/1.73M2
EST. AVERAGE GLUCOSE BLD GHB EST-MCNC: 123 MG/DL
EST. AVERAGE GLUCOSE BLD GHB EST-SCNC: 6.8 MMOL/L
GLUCOSE SERPL-MCNC: 93 MG/DL (ref 65–99)
HBA1C MFR BLD: 5.9 % OF TOTAL HGB
HDLC SERPL-MCNC: 80 MG/DL
LDLC SERPL CALC-MCNC: 98 MG/DL (CALC)
NONHDLC SERPL-MCNC: 115 MG/DL (CALC)
POTASSIUM SERPL-SCNC: 4.5 MMOL/L (ref 3.5–5.3)
PROT SERPL-MCNC: 7 G/DL (ref 6.1–8.1)
SODIUM SERPL-SCNC: 141 MMOL/L (ref 135–146)
TRIGL SERPL-MCNC: 77 MG/DL
TSH SERPL-ACNC: 1.59 MIU/L (ref 0.4–4.5)

## 2025-03-24 ENCOUNTER — TELEPHONE (OUTPATIENT)
Dept: PRIMARY CARE | Facility: CLINIC | Age: 70
End: 2025-03-24

## 2025-03-24 ENCOUNTER — APPOINTMENT (OUTPATIENT)
Dept: PRIMARY CARE | Facility: CLINIC | Age: 70
End: 2025-03-24
Payer: MEDICARE

## 2025-03-24 VITALS
BODY MASS INDEX: 20.43 KG/M2 | WEIGHT: 111 LBS | DIASTOLIC BLOOD PRESSURE: 70 MMHG | RESPIRATION RATE: 16 BRPM | HEART RATE: 73 BPM | OXYGEN SATURATION: 100 % | SYSTOLIC BLOOD PRESSURE: 142 MMHG | HEIGHT: 62 IN

## 2025-03-24 DIAGNOSIS — R79.9 ABNORMAL BLOOD CHEMISTRY: ICD-10-CM

## 2025-03-24 DIAGNOSIS — I10 HYPERTENSION, UNSPECIFIED TYPE: ICD-10-CM

## 2025-03-24 DIAGNOSIS — E55.9 VITAMIN D DEFICIENCY: ICD-10-CM

## 2025-03-24 DIAGNOSIS — M54.9 BACK PAIN, UNSPECIFIED BACK LOCATION, UNSPECIFIED BACK PAIN LATERALITY, UNSPECIFIED CHRONICITY: ICD-10-CM

## 2025-03-24 DIAGNOSIS — E78.5 HYPERLIPIDEMIA, UNSPECIFIED HYPERLIPIDEMIA TYPE: ICD-10-CM

## 2025-03-24 DIAGNOSIS — I25.10 NONOBSTRUCTIVE ATHEROSCLEROSIS OF CORONARY ARTERY: ICD-10-CM

## 2025-03-24 DIAGNOSIS — H91.93 HEARING DECREASED, BILATERAL: ICD-10-CM

## 2025-03-24 DIAGNOSIS — E78.00 PURE HYPERCHOLESTEROLEMIA: ICD-10-CM

## 2025-03-24 DIAGNOSIS — E03.9 HYPOTHYROIDISM, UNSPECIFIED TYPE: ICD-10-CM

## 2025-03-24 DIAGNOSIS — Z00.00 ROUTINE GENERAL MEDICAL EXAMINATION AT HEALTH CARE FACILITY: ICD-10-CM

## 2025-03-24 DIAGNOSIS — R09.89 CAROTID BRUIT, UNSPECIFIED LATERALITY: Primary | ICD-10-CM

## 2025-03-24 DIAGNOSIS — R91.8 ABNORMAL RADIOLOGIC FINDING OF LUNG FIELD: ICD-10-CM

## 2025-03-24 PROCEDURE — 3077F SYST BP >= 140 MM HG: CPT | Performed by: INTERNAL MEDICINE

## 2025-03-24 PROCEDURE — 1170F FXNL STATUS ASSESSED: CPT | Performed by: INTERNAL MEDICINE

## 2025-03-24 PROCEDURE — 3008F BODY MASS INDEX DOCD: CPT | Performed by: INTERNAL MEDICINE

## 2025-03-24 PROCEDURE — 99214 OFFICE O/P EST MOD 30 MIN: CPT | Performed by: INTERNAL MEDICINE

## 2025-03-24 PROCEDURE — 1036F TOBACCO NON-USER: CPT | Performed by: INTERNAL MEDICINE

## 2025-03-24 PROCEDURE — 1123F ACP DISCUSS/DSCN MKR DOCD: CPT | Performed by: INTERNAL MEDICINE

## 2025-03-24 PROCEDURE — 99397 PER PM REEVAL EST PAT 65+ YR: CPT | Performed by: INTERNAL MEDICINE

## 2025-03-24 PROCEDURE — G0439 PPPS, SUBSEQ VISIT: HCPCS | Performed by: INTERNAL MEDICINE

## 2025-03-24 PROCEDURE — 1158F ADVNC CARE PLAN TLK DOCD: CPT | Performed by: INTERNAL MEDICINE

## 2025-03-24 PROCEDURE — 3078F DIAST BP <80 MM HG: CPT | Performed by: INTERNAL MEDICINE

## 2025-03-24 RX ORDER — ROSUVASTATIN CALCIUM 40 MG/1
40 TABLET, COATED ORAL NIGHTLY
Qty: 90 TABLET | Refills: 1 | Status: SHIPPED | OUTPATIENT
Start: 2025-03-24

## 2025-03-24 RX ORDER — LEVOTHYROXINE SODIUM 50 UG/1
50 TABLET ORAL DAILY
Qty: 90 TABLET | Refills: 3 | Status: SHIPPED | OUTPATIENT
Start: 2025-03-24

## 2025-03-24 RX ORDER — LISINOPRIL 20 MG/1
20 TABLET ORAL DAILY
Qty: 90 TABLET | Refills: 1 | Status: SHIPPED | OUTPATIENT
Start: 2025-03-24

## 2025-03-24 RX ORDER — CYCLOBENZAPRINE HCL 10 MG
10 TABLET ORAL NIGHTLY PRN
Qty: 30 TABLET | Refills: 0 | Status: SHIPPED | OUTPATIENT
Start: 2025-03-24 | End: 2025-04-23

## 2025-03-24 ASSESSMENT — PATIENT HEALTH QUESTIONNAIRE - PHQ9
SUM OF ALL RESPONSES TO PHQ9 QUESTIONS 1 AND 2: 0
2. FEELING DOWN, DEPRESSED OR HOPELESS: NOT AT ALL
1. LITTLE INTEREST OR PLEASURE IN DOING THINGS: NOT AT ALL

## 2025-03-24 ASSESSMENT — ACTIVITIES OF DAILY LIVING (ADL)
GROCERY_SHOPPING: INDEPENDENT
MANAGING_FINANCES: INDEPENDENT
DRESSING: INDEPENDENT
DOING_HOUSEWORK: INDEPENDENT
TAKING_MEDICATION: INDEPENDENT
BATHING: INDEPENDENT

## 2025-03-24 ASSESSMENT — LIFESTYLE VARIABLES
HOW MANY STANDARD DRINKS CONTAINING ALCOHOL DO YOU HAVE ON A TYPICAL DAY: 1 OR 2
HOW OFTEN DO YOU HAVE A DRINK CONTAINING ALCOHOL: 4 OR MORE TIMES A WEEK

## 2025-03-24 NOTE — PATIENT INSTRUCTIONS
Return to taking the rosuvastatin at 5pm and stricter diet (hold cuellar)    Please call our  [phone] to assist with scheduling   Ct chest try for end of April , do the nonfasting blood test right before

## 2025-03-24 NOTE — PROGRESS NOTES
Subjective   Patient ID: Lashae Wolf is a 69 y.o. female who presents for [facility] [place].    HPI  Patient in for a visit  Prediabetic, taking crestor at 2 pm   Last vit d was okay so we did not check     Patient comes in for a physical exam last one done over a year ago , doing well over-all with no particular complaints. Also is in for laboratory review and health maintenance update.  Updating family history as well.  Interval event - past medical history, surgical, social, and family history reviewed and updated.  Interval care -  Patient is    up to date with dental care.  Patient does     receive routine vision care.    Also here for [facility] Annual Wellness Visit       Review of Systems  General: Denies fever, chills, night sweats,  Eyes: Negative for recent visual changes  Ears, Nose, Throat :  Negative for hearing changes, sinus discomfort  Dermatologic: Negative for new skin conditions, rash  Respiratory: Negative for wheezing, shortness of breath, cough  Cardiovascular: Negative for chest pain, palpitations, or leg swelling  Gastrointestinal: Negative for nausea/vomiting, abdominal pain, changes in bowel habits  Genitourinary Negative for Urinary Incontinence  urgency , frequency, discomfort   Musculoskeletal: see hpi  Neurological: Negative for headaches, dizziness    Previous history  Past Medical History:   Diagnosis Date   • Abnormal levels of other serum enzymes 03/15/2019    Elevated liver enzymes   • Acquired deformity of chest and rib 07/29/2019    Acquired deformity of rib of left side   • Acute atopic conjunctivitis, unspecified eye 05/22/2020    Allergic conjunctivitis   • Adverse effect of other vaccines and biological substances, initial encounter 09/22/2022    Vaccine reaction   • Allergic    • Asymptomatic menopausal state 05/26/2022    Asymptomatic menopausal state   • Bradycardia, unspecified 08/25/2016    Bradycardia   • Chondrocostal junction syndrome (tietze) 06/07/2017    Costochondritis   •  Disease of thyroid gland    • Dorsalgia, unspecified 05/26/2022    Back ache   • Encounter for general adult medical examination without abnormal findings 05/26/2022    Encounter for Medicare annual wellness exam   • Encounter for immunization 01/06/2015    Flu vaccine need   • Encounter for immunization 05/22/2020    Need for pneumococcal vaccination   • Encounter for immunization 07/16/2018    Need for shingles vaccine   • Encounter for immunization 10/07/2015    Need for vaccination   • Eructation 06/07/2017    Burping   • Essential (primary) hypertension 10/31/2022    Hypertension   • Functional dyspepsia 09/22/2022    Acid indigestion   • Hypothyroidism, unspecified 10/18/2022    Hypothyroidism   • Menopausal and female climacteric states 07/19/2016    Postmenopausal disorder   • Other abnormality of red blood cells 09/09/2019    Elevated MCV   • Other forms of scoliosis, site unspecified 02/28/2018    Dextroscoliosis   • Other specified abnormal findings of blood chemistry 10/31/2022    Abnormal liver function test   • Prepatellar bursitis, right knee 05/09/2022    Prepatellar bursitis of right knee   • Unspecified conjunctivitis 05/06/2020    Conjunctivitis   • Unspecified symptoms and signs involving the genitourinary system 10/31/2022    Abnormal renal finding   • Urge incontinence 02/10/2015    Urge incontinence of urine   • Urinary tract infection, site not specified 11/25/2014    Bacterial UTI   • Vitamin D deficiency, unspecified 10/31/2022    Vitamin D deficiency     Past Surgical History:   Procedure Laterality Date   • BREAST BIOPSY  not sure in chart   • JOINT REPLACEMENT       Social History     Tobacco Use   • Smoking status: Never     Passive exposure: Never   • Smokeless tobacco: Never   Vaping Use   • Vaping status: Never Used   Substance Use Topics   • Alcohol use: Yes     Alcohol/week: 7.0 standard drinks of alcohol     Types: 7 Glasses of wine per week   • Drug use: Never     Family History    Problem Relation Name Age of Onset   • Hypertension Mother Tanisha    • Stroke Mother Tanisha    • Esophageal cancer Father Alek    • Throat cancer Father Alek    • Cancer Father Alek    • Hypertension Sister Jossy    • Parkinsonism Sister Jossy      Allergies   Allergen Reactions   • Amoxicillin-Pot Clavulanate GI Upset   • Sulfa (Sulfonamide Antibiotics) Other     Respiratory distress        Current Outpatient Medications   Medication Instructions   • cetirizine (ZyrTEC) 10 mg tablet 1 tablet, Daily   • cyclobenzaprine (FLEXERIL) 10 mg, oral, Nightly PRN   • fluticasone (Flonase) 50 mcg/actuation nasal spray 2 sprays, Daily   • levothyroxine (SYNTHROID, LEVOXYL) 50 mcg, oral, Daily   • lisinopril 20 mg, oral, Daily   • multivitamin/iron/folic acid (CENTRUM WOMEN ORAL) 1 tablet, Daily   • mupirocin (Bactroban) 2 % ointment Topical, 3 times daily RT   • olopatadine (Pataday) 0.2 % ophthalmic solution Administer into affected eye(s).   • rosuvastatin (CRESTOR) 40 mg, oral, Nightly   • rosuvastatin (CRESTOR) 40 mg, oral, Nightly       Objective       Physical Exam      Assessment/Plan   Lashae Wolf is a 69 y.o. female who presents for the concerns below:    Problem List Items Addressed This Visit    None           Discussed with:   Return in :    Portions of this note were generated using digital voice recognition software, and may contain grammatical errors       Milo Joseph MD  03/24/25  9:41 AM

## 2025-05-06 DIAGNOSIS — R93.89 ABNORMAL CT OF THE CHEST: Primary | ICD-10-CM

## 2025-05-12 ENCOUNTER — HOSPITAL ENCOUNTER (OUTPATIENT)
Dept: RADIOLOGY | Facility: CLINIC | Age: 70
Discharge: HOME | End: 2025-05-12
Payer: MEDICARE

## 2025-05-12 DIAGNOSIS — R93.89 ABNORMAL CT OF THE CHEST: ICD-10-CM

## 2025-05-12 PROCEDURE — 71250 CT THORAX DX C-: CPT

## 2025-05-12 PROCEDURE — 71250 CT THORAX DX C-: CPT | Performed by: RADIOLOGY

## 2025-07-01 ENCOUNTER — APPOINTMENT (OUTPATIENT)
Dept: PRIMARY CARE | Facility: CLINIC | Age: 70
End: 2025-07-01
Payer: MEDICARE

## 2025-07-23 LAB
ANION GAP SERPL CALCULATED.4IONS-SCNC: 11 MMOL/L (CALC) (ref 7–17)
BUN SERPL-MCNC: 19 MG/DL (ref 7–25)
BUN/CREAT SERPL: ABNORMAL (CALC) (ref 6–22)
CALCIUM SERPL-MCNC: 9.9 MG/DL (ref 8.6–10.4)
CHLORIDE SERPL-SCNC: 102 MMOL/L (ref 98–110)
CHOLEST SERPL-MCNC: 168 MG/DL
CHOLEST/HDLC SERPL: 1.7 (CALC)
CO2 SERPL-SCNC: 27 MMOL/L (ref 20–32)
CREAT SERPL-MCNC: 0.76 MG/DL (ref 0.6–1)
EGFRCR SERPLBLD CKD-EPI 2021: 84 ML/MIN/1.73M2
GLUCOSE SERPL-MCNC: 108 MG/DL (ref 65–99)
HDLC SERPL-MCNC: 97 MG/DL
LDLC SERPL CALC-MCNC: 56 MG/DL (CALC)
NONHDLC SERPL-MCNC: 71 MG/DL (CALC)
POTASSIUM SERPL-SCNC: 4.4 MMOL/L (ref 3.5–5.3)
SODIUM SERPL-SCNC: 140 MMOL/L (ref 135–146)
TRIGL SERPL-MCNC: 74 MG/DL

## 2025-07-28 ENCOUNTER — APPOINTMENT (OUTPATIENT)
Dept: PRIMARY CARE | Facility: CLINIC | Age: 70
End: 2025-07-28
Payer: MEDICARE

## 2025-07-28 VITALS
OXYGEN SATURATION: 98 % | DIASTOLIC BLOOD PRESSURE: 68 MMHG | WEIGHT: 106 LBS | RESPIRATION RATE: 16 BRPM | BODY MASS INDEX: 19.39 KG/M2 | SYSTOLIC BLOOD PRESSURE: 136 MMHG | HEART RATE: 69 BPM

## 2025-07-28 DIAGNOSIS — E03.9 HYPOTHYROIDISM, UNSPECIFIED TYPE: ICD-10-CM

## 2025-07-28 DIAGNOSIS — J31.0 RHINITIS, UNSPECIFIED TYPE: Primary | ICD-10-CM

## 2025-07-28 DIAGNOSIS — E78.5 HYPERLIPIDEMIA, UNSPECIFIED HYPERLIPIDEMIA TYPE: ICD-10-CM

## 2025-07-28 DIAGNOSIS — I10 HYPERTENSION, UNSPECIFIED TYPE: ICD-10-CM

## 2025-07-28 DIAGNOSIS — R23.9 SKIN CHANGE: ICD-10-CM

## 2025-07-28 PROCEDURE — 3075F SYST BP GE 130 - 139MM HG: CPT | Performed by: INTERNAL MEDICINE

## 2025-07-28 PROCEDURE — 99214 OFFICE O/P EST MOD 30 MIN: CPT | Performed by: INTERNAL MEDICINE

## 2025-07-28 PROCEDURE — 3078F DIAST BP <80 MM HG: CPT | Performed by: INTERNAL MEDICINE

## 2025-07-28 PROCEDURE — 1159F MED LIST DOCD IN RCRD: CPT | Performed by: INTERNAL MEDICINE

## 2025-07-28 PROCEDURE — 1036F TOBACCO NON-USER: CPT | Performed by: INTERNAL MEDICINE

## 2025-07-28 RX ORDER — MUPIROCIN 20 MG/G
OINTMENT TOPICAL
Qty: 30 G | Refills: 0 | Status: SHIPPED | OUTPATIENT
Start: 2025-07-28

## 2025-07-28 RX ORDER — LISINOPRIL 20 MG/1
20 TABLET ORAL DAILY
Qty: 90 TABLET | Refills: 1 | Status: SHIPPED | OUTPATIENT
Start: 2025-07-28

## 2025-07-28 RX ORDER — ROSUVASTATIN CALCIUM 40 MG/1
40 TABLET, COATED ORAL NIGHTLY
Qty: 90 TABLET | Refills: 1 | Status: SHIPPED | OUTPATIENT
Start: 2025-07-28

## 2025-07-28 RX ORDER — FLUTICASONE PROPIONATE 50 MCG
2 SPRAY, SUSPENSION (ML) NASAL DAILY
Qty: 16 G | Refills: 2 | Status: SHIPPED | OUTPATIENT
Start: 2025-07-28

## 2025-07-28 RX ORDER — LEVOTHYROXINE SODIUM 50 UG/1
50 TABLET ORAL DAILY
Qty: 90 TABLET | Refills: 3 | Status: SHIPPED | OUTPATIENT
Start: 2025-07-28

## 2025-07-28 ASSESSMENT — PATIENT HEALTH QUESTIONNAIRE - PHQ9
2. FEELING DOWN, DEPRESSED OR HOPELESS: NOT AT ALL
1. LITTLE INTEREST OR PLEASURE IN DOING THINGS: NOT AT ALL
SUM OF ALL RESPONSES TO PHQ9 QUESTIONS 1 AND 2: 0

## 2025-07-28 NOTE — PATIENT INSTRUCTIONS
HYPERCHOLESTEROLEMIA PLAN: stable  continue current medications  Follow low cholesterol diet, encouraged high omega 3 fatty acid intake in diet, exercise, weight control. . Will Obtain AST/ALT, fasting lipid profile, creatine kinase  on statin if not done within past 3-6 months . Coenzyme Q10 200 mg a day if able to help increase HDL.

## 2025-07-28 NOTE — PROGRESS NOTES
Subjective   Patient ID: Lashae Wolf is a 70 y.o. female who presents for Follow-up.    HPI  Patient in for a visit  Her LDL is excellent   Blood pressure a humaira.le high  Review of Systems  General: Denies fever, chills, night sweats,  Eyes: Negative for recent visual changes  Ears, Nose, Throat :  Negative for hearing changes, sinus discomfort  Dermatologic: Negative for new skin conditions, rash  Respiratory: Negative for wheezing, shortness of breath, cough  Cardiovascular: Negative for chest pain, palpitations, or leg swelling  Gastrointestinal: Negative for nausea/vomiting, abdominal pain, changes in bowel habits  Genitourinary Negative for Urinary Incontinence  urgency , frequency, discomfort   Musculoskeletal: see hpi  Neurological: Negative for headaches, dizziness    Previous history  Medical History[1]  Surgical History[2]  Social History[3]  Family History[4]  Allergies[5]  Current Outpatient Medications   Medication Instructions    cetirizine (ZyrTEC) 10 mg tablet 1 tablet, Daily    cyclobenzaprine (FLEXERIL) 10 mg, oral, Nightly PRN    fluticasone (Flonase) 50 mcg/actuation nasal spray 2 sprays, Daily    levothyroxine (SYNTHROID, LEVOXYL) 50 mcg, oral, Daily    lisinopril 20 mg, oral, Daily    multivitamin/iron/folic acid (CENTRUM WOMEN ORAL) 1 tablet, Daily    mupirocin (Bactroban) 2 % ointment Topical, 3 times daily RT    olopatadine (Pataday) 0.2 % ophthalmic solution Administer into affected eye(s).    rosuvastatin (CRESTOR) 40 mg, oral, Nightly    rosuvastatin (CRESTOR) 40 mg, oral, Nightly       Objective       Physical Exam  Vital Signs: as recorded above  General: Well groomed, well nourished   Orientation:  Alert , oriented to time, place , and person   Mood and Affect:  Cooperative , no apparent distress normal affect  Skin: Good color, good turgor  Eyes: Extra ocular muscle movements intact, anicteric sclerae  Neck: Supple, full range of movement  Chest: Normal breath sounds, normal chest  wall exam, symmetric, good air entry, clear to auscultation  Heart: Regular rate and rhythm, without murmur, gallop, or rubs  BACK:  no CTLS spine tenderness, no flank tenderness  Extremities: full range of movement  bilateral UE and bilateral LE,  no lower extremity edema  Neurological: Alert, oriented, cranial nerves II-XII grossly intact except for visual acuity  Sensation:  Intact   Gait: normal steady      Assessment/Plan   Lashae Wolf is a 70 y.o. female who presents for the concerns below:    Problem List Items Addressed This Visit    None  HYPERCHOLESTEROLEMIA PLAN: stable improved LDL with stricter diet  continue current medications  continue exercise, weight control. .seeing cardiology in October     HYPERTENSION PLAN:  , taking blood pressure medication  Follow low salt diet, exercise as discussed, weight control. Continue current medications    HYPOTHYROIDISM PLAN:  Patient is clinically stable does not have any changes in bowel habits, skin, blood pressure, heart rates, weight, and mood, will be due for TSH check.  Refills as needed.   Reiterated that patient takes medication on an empty stomach.    ALLERGIC RHINITIS PLAN:  Supportive care, plenty of fluids, Tylenol or use prescribed steroid nasal spray.   Still maintain good hand washing practices.   Consider antihistamine         Discussed with:   Return in : 6 months    Portions of this note were generated using digital voice recognition software, and may contain grammatical errors       Milo Joseph MD  07/28/25  10:59 AM       [1]   Past Medical History:  Diagnosis Date    Abnormal levels of other serum enzymes 03/15/2019    Elevated liver enzymes    Acquired deformity of chest and rib 07/29/2019    Acquired deformity of rib of left side    Acute atopic conjunctivitis, unspecified eye 05/22/2020    Allergic conjunctivitis    Adverse effect of other vaccines and biological substances, initial encounter 09/22/2022    Vaccine  reaction    Allergic     Asymptomatic menopausal state 05/26/2022    Asymptomatic menopausal state    Bradycardia, unspecified 08/25/2016    Bradycardia    Chondrocostal junction syndrome (tietze) 06/07/2017    Costochondritis    Disease of thyroid gland     Dorsalgia, unspecified 05/26/2022    Back ache    Encounter for general adult medical examination without abnormal findings 05/26/2022    Encounter for Medicare annual wellness exam    Encounter for immunization 01/06/2015    Flu vaccine need    Encounter for immunization 05/22/2020    Need for pneumococcal vaccination    Encounter for immunization 07/16/2018    Need for shingles vaccine    Encounter for immunization 10/07/2015    Need for vaccination    Eructation 06/07/2017    Burping    Essential (primary) hypertension 10/31/2022    Hypertension    Functional dyspepsia 09/22/2022    Acid indigestion    HL (hearing loss)     Hypothyroidism, unspecified 10/18/2022    Hypothyroidism    Menopausal and female climacteric states 07/19/2016    Postmenopausal disorder    Other abnormality of red blood cells 09/09/2019    Elevated MCV    Other forms of scoliosis, site unspecified 02/28/2018    Dextroscoliosis    Other specified abnormal findings of blood chemistry 10/31/2022    Abnormal liver function test    Prepatellar bursitis, right knee 05/09/2022    Prepatellar bursitis of right knee    Unspecified conjunctivitis 05/06/2020    Conjunctivitis    Unspecified symptoms and signs involving the genitourinary system 10/31/2022    Abnormal renal finding    Urge incontinence 02/10/2015    Urge incontinence of urine    Urinary tract infection, site not specified 11/25/2014    Bacterial UTI    Vitamin D deficiency, unspecified 10/31/2022    Vitamin D deficiency   [2]   Past Surgical History:  Procedure Laterality Date    BREAST BIOPSY  not sure in chart    JOINT REPLACEMENT     [3]   Social History  Tobacco Use    Smoking status: Never     Passive exposure: Never     Smokeless tobacco: Never   Vaping Use    Vaping status: Never Used   Substance Use Topics    Alcohol use: Yes     Alcohol/week: 7.0 standard drinks of alcohol     Types: 7 Glasses of wine per week    Drug use: Never   [4]   Family History  Problem Relation Name Age of Onset    Hypertension Mother Tanisha     Stroke Mother Tanisha     Esophageal cancer Father Alek     Throat cancer Father Alek     Cancer Father Alek     Hypertension Sister Jossy     Parkinsonism Sister Jossy     No Known Problems Daughter mariana     No Known Problems Son     [5]   Allergies  Allergen Reactions    Amoxicillin-Pot Clavulanate GI Upset    Sulfa (Sulfonamide Antibiotics) Other     Respiratory distress

## 2025-08-22 DIAGNOSIS — J31.0 RHINITIS, UNSPECIFIED TYPE: ICD-10-CM

## 2025-08-22 RX ORDER — FLUTICASONE PROPIONATE 50 MCG
2 SPRAY, SUSPENSION (ML) NASAL DAILY
Qty: 48 ML | Refills: 1 | Status: SHIPPED | OUTPATIENT
Start: 2025-08-22

## 2026-01-28 ENCOUNTER — APPOINTMENT (OUTPATIENT)
Dept: PRIMARY CARE | Facility: CLINIC | Age: 71
End: 2026-01-28
Payer: MEDICARE